# Patient Record
Sex: FEMALE | Race: WHITE | NOT HISPANIC OR LATINO
[De-identification: names, ages, dates, MRNs, and addresses within clinical notes are randomized per-mention and may not be internally consistent; named-entity substitution may affect disease eponyms.]

---

## 2017-02-09 ENCOUNTER — APPOINTMENT (OUTPATIENT)
Dept: INTERNAL MEDICINE | Facility: CLINIC | Age: 27
End: 2017-02-09

## 2017-02-27 ENCOUNTER — APPOINTMENT (OUTPATIENT)
Dept: INTERNAL MEDICINE | Facility: CLINIC | Age: 27
End: 2017-02-27

## 2017-02-27 VITALS — HEIGHT: 61.5 IN | HEART RATE: 85 BPM | WEIGHT: 99 LBS | BODY MASS INDEX: 18.45 KG/M2 | OXYGEN SATURATION: 100 %

## 2017-02-27 VITALS — SYSTOLIC BLOOD PRESSURE: 100 MMHG | DIASTOLIC BLOOD PRESSURE: 70 MMHG

## 2017-02-27 DIAGNOSIS — Z23 ENCOUNTER FOR IMMUNIZATION: ICD-10-CM

## 2017-05-13 ENCOUNTER — RX RENEWAL (OUTPATIENT)
Age: 27
End: 2017-05-13

## 2017-05-13 LAB
ALBUMIN SERPL ELPH-MCNC: 4.6 G/DL
ALP BLD-CCNC: 48 U/L
ALT SERPL-CCNC: 14 U/L
ANION GAP SERPL CALC-SCNC: 15 MMOL/L
AST SERPL-CCNC: 24 U/L
BASOPHILS # BLD AUTO: 0.06 K/UL
BASOPHILS NFR BLD AUTO: 1 %
BILIRUB SERPL-MCNC: 0.4 MG/DL
BILIRUB UR QL STRIP: NORMAL
BUN SERPL-MCNC: 14 MG/DL
CALCIUM SERPL-MCNC: 9.8 MG/DL
CHLORIDE SERPL-SCNC: 101 MMOL/L
CHOLEST SERPL-MCNC: 188 MG/DL
CHOLEST/HDLC SERPL: 2.1 RATIO
CO2 SERPL-SCNC: 23 MMOL/L
CREAT SERPL-MCNC: 0.72 MG/DL
EOSINOPHIL # BLD AUTO: 0.23 K/UL
EOSINOPHIL NFR BLD AUTO: 3.8 %
GLUCOSE SERPL-MCNC: 94 MG/DL
GLUCOSE UR-MCNC: NORMAL
HCG UR QL: 0.2 EU/DL
HCT VFR BLD CALC: 40.7 %
HDLC SERPL-MCNC: 89 MG/DL
HGB BLD-MCNC: 13 G/DL
HGB UR QL STRIP.AUTO: NORMAL
IMM GRANULOCYTES NFR BLD AUTO: 0.2 %
KETONES UR-MCNC: NORMAL
LDLC SERPL CALC-MCNC: 87 MG/DL
LEUKOCYTE ESTERASE UR QL STRIP: NORMAL
LYMPHOCYTES # BLD AUTO: 2.22 K/UL
LYMPHOCYTES NFR BLD AUTO: 36.8 %
MAN DIFF?: NORMAL
MCHC RBC-ENTMCNC: 30.7 PG
MCHC RBC-ENTMCNC: 31.9 GM/DL
MCV RBC AUTO: 96.2 FL
MONOCYTES # BLD AUTO: 0.61 K/UL
MONOCYTES NFR BLD AUTO: 10.1 %
NEUTROPHILS # BLD AUTO: 2.9 K/UL
NEUTROPHILS NFR BLD AUTO: 48.1 %
NITRITE UR QL STRIP: NORMAL
PH UR STRIP: 5.5
PLATELET # BLD AUTO: 346 K/UL
POTASSIUM SERPL-SCNC: 3.8 MMOL/L
PROT SERPL-MCNC: 7.3 G/DL
PROT UR STRIP-MCNC: NORMAL
RBC # BLD: 4.23 M/UL
RBC # FLD: 12.7 %
SODIUM SERPL-SCNC: 139 MMOL/L
SP GR UR STRIP: 1.02
T4 FREE SERPL-MCNC: 0.9 NG/DL
TRIGL SERPL-MCNC: 58 MG/DL
TSH SERPL-ACNC: 1.99 UIU/ML
WBC # FLD AUTO: 6.03 K/UL

## 2017-10-09 ENCOUNTER — RESULT REVIEW (OUTPATIENT)
Age: 27
End: 2017-10-09

## 2017-10-09 ENCOUNTER — APPOINTMENT (OUTPATIENT)
Dept: PEDIATRIC ALLERGY IMMUNOLOGY | Facility: CLINIC | Age: 27
End: 2017-10-09
Payer: COMMERCIAL

## 2017-10-09 ENCOUNTER — NON-APPOINTMENT (OUTPATIENT)
Age: 27
End: 2017-10-09

## 2017-10-09 ENCOUNTER — LABORATORY RESULT (OUTPATIENT)
Age: 27
End: 2017-10-09

## 2017-10-09 VITALS
WEIGHT: 100.8 LBS | HEART RATE: 68 BPM | SYSTOLIC BLOOD PRESSURE: 101 MMHG | BODY MASS INDEX: 18.55 KG/M2 | DIASTOLIC BLOOD PRESSURE: 69 MMHG | HEIGHT: 61.69 IN

## 2017-10-09 PROCEDURE — 94060 EVALUATION OF WHEEZING: CPT

## 2017-10-09 PROCEDURE — 36415 COLL VENOUS BLD VENIPUNCTURE: CPT

## 2017-10-09 PROCEDURE — 99244 OFF/OP CNSLTJ NEW/EST MOD 40: CPT

## 2017-10-09 RX ORDER — TETRAHYDROZOLINE HCL 0.05 %
0.05 DROPS OPHTHALMIC (EYE)
Refills: 0 | Status: DISCONTINUED | COMMUNITY
Start: 2017-10-09 | End: 2017-10-09

## 2017-10-12 LAB
A ALTERNATA IGE QN: <0.1 KUA/L
A FUMIGATUS IGE QN: <0.1 KUA/L
AMER BEECH IGE QN: ABNORMAL
BOXELDER IGE QN: <0.1 KUA/L
C HERBARUM IGE QN: <0.1 KUA/L
C LUNATA IGE QN: <0.1 KUA/L
CAT DANDER IGE QN: 0.29 KUA/L
CMN PIGWEED IGE QN: 0.1 KUA/L
COCKLEBUR IGE QN: <0.1 KUA/L
COCKSFOOT IGE QN: <0.1 KUA/L
COMMON RAGWEED IGE QN: <0.1 KUA/L
COTTONWOOD IGE QN: <0.1 KUA/L
D FARINAE IGE QN: 6.83 KUA/L
D PTERONYSS IGE QN: 6.81 KUA/L
DEPRECATED A ALTERNATA IGE RAST QL: 0
DEPRECATED A FUMIGATUS IGE RAST QL: 0
DEPRECATED A PULLULANS IGE RAST QL: 0
DEPRECATED AMER BEECH IGE RAST QL: 0.84 KUA/L
DEPRECATED BOXELDER IGE RAST QL: 0
DEPRECATED C HERBARUM IGE RAST QL: 0
DEPRECATED C LUNATA IGE RAST QL: 0
DEPRECATED CAT DANDER IGE RAST QL: NORMAL
DEPRECATED COCKLEBUR IGE RAST QL: 0
DEPRECATED COCKSFOOT IGE RAST QL: 0
DEPRECATED COMMON PIGWEED IGE RAST QL: NORMAL
DEPRECATED COMMON RAGWEED IGE RAST QL: 0
DEPRECATED COTTONWOOD IGE RAST QL: 0
DEPRECATED D FARINAE IGE RAST QL: ABNORMAL
DEPRECATED D PTERONYSS IGE RAST QL: ABNORMAL
DEPRECATED DOG DANDER IGE RAST QL: ABNORMAL
DEPRECATED ENGL PLANTAIN IGE RAST QL: 0
DEPRECATED F MONILIFORME IGE RAST QL: 0
DEPRECATED GIANT RAGWEED IGE RAST QL: 0
DEPRECATED GOOSE FEATHER IGE RAST QL: 0
DEPRECATED GOOSEFOOT IGE RAST QL: 0
DEPRECATED KENT BLUE GRASS IGE RAST QL: 0
DEPRECATED LONDON PLANE IGE RAST QL: 0
DEPRECATED M RACEMOSUS IGE RAST QL: 0
DEPRECATED MUGWORT IGE RAST QL: 0
DEPRECATED P NOTATUM IGE RAST QL: 0
DEPRECATED R NIGRICANS IGE RAST QL: 0
DEPRECATED RED CEDAR IGE RAST QL: 0
DEPRECATED RED TOP GRASS IGE RAST QL: 0
DEPRECATED ROACH IGE RAST QL: 0
DEPRECATED RYE IGE RAST QL: 0
DEPRECATED SALTWORT IGE RAST QL: 0
DEPRECATED SILVER BIRCH IGE RAST QL: ABNORMAL
DEPRECATED TIMOTHY IGE RAST QL: 0
DEPRECATED WHITE ASH IGE RAST QL: 0
DEPRECATED WHITE HICKORY IGE RAST QL: 0
DEPRECATED WHITE OAK IGE RAST QL: ABNORMAL
DOG DANDER IGE QN: 0.56 KUA/L
ENGL PLANTAIN IGE QN: <0.1 KUA/L
F MONILIFORME IGE QN: <0.1 KUA/L
GIANT RAGWEED IGE QN: <0.1 KUA/L
GOOSE FEATHER IGE QN: <0.1 KUA/L
GOOSEFOOT IGE QN: <0.1 KUA/L
GRAY ALDER (T2) CLASS: ABNORMAL
GRAY ALDER (T2) CONC: 1.34 KUA/L
KENT BLUE GRASS IGE QN: <0.1 KUA/L
LONDON PLANE IGE QN: <0.1 KUA/L
M RACEMOSUS IGE QN: <0.1 KUA/L
MOLD (AUREOBASIDIUM M12) CONC: <0.1 KUA/L
MUGWORT IGE QN: <0.1 KUA/L
MULBERRY (T70) CLASS: 0
MULBERRY (T70) CONC: <0.1 KUA/L
P NOTATUM IGE QN: <0.1 KUA/L
R NIGRICANS IGE QN: <0.1 KUA/L
RED CEDAR IGE QN: <0.1 KUA/L
RED TOP GRASS IGE QN: <0.1 KUA/L
ROACH IGE QN: <0.1 KUA/L
RYE IGE QN: <0.1 KUA/L
SALTWORT IGE QN: <0.1 KUA/L
SILVER BIRCH IGE QN: 1.7 KUA/L
TIMOTHY IGE QN: <0.1 KUA/L
TOTAL IGE SMQN RAST: 33 KU/L
WHITE ASH IGE QN: <0.1 KUA/L
WHITE ELM IGE QN: 0.18 KUA/L
WHITE ELM IGE QN: NORMAL
WHITE HICKORY IGE QN: <0.1 KUA/L
WHITE OAK IGE QN: 0.73 KUA/L

## 2017-10-31 ENCOUNTER — RX RENEWAL (OUTPATIENT)
Age: 27
End: 2017-10-31

## 2017-11-13 ENCOUNTER — NON-APPOINTMENT (OUTPATIENT)
Age: 27
End: 2017-11-13

## 2017-11-13 ENCOUNTER — APPOINTMENT (OUTPATIENT)
Dept: PEDIATRIC ALLERGY IMMUNOLOGY | Facility: CLINIC | Age: 27
End: 2017-11-13
Payer: COMMERCIAL

## 2017-11-13 VITALS
HEART RATE: 72 BPM | HEIGHT: 61.69 IN | OXYGEN SATURATION: 98 % | WEIGHT: 102 LBS | SYSTOLIC BLOOD PRESSURE: 107 MMHG | DIASTOLIC BLOOD PRESSURE: 69 MMHG | BODY MASS INDEX: 18.77 KG/M2

## 2017-11-13 DIAGNOSIS — J30.2 OTHER ALLERGIC RHINITIS: ICD-10-CM

## 2017-11-13 DIAGNOSIS — J30.89 OTHER ALLERGIC RHINITIS: ICD-10-CM

## 2017-11-13 PROCEDURE — 94010 BREATHING CAPACITY TEST: CPT

## 2017-11-13 PROCEDURE — 99214 OFFICE O/P EST MOD 30 MIN: CPT | Mod: 25

## 2017-11-28 ENCOUNTER — RX RENEWAL (OUTPATIENT)
Age: 27
End: 2017-11-28

## 2017-12-30 ENCOUNTER — RX RENEWAL (OUTPATIENT)
Age: 27
End: 2017-12-30

## 2018-01-22 ENCOUNTER — RX RENEWAL (OUTPATIENT)
Age: 28
End: 2018-01-22

## 2018-01-23 ENCOUNTER — TRANSCRIPTION ENCOUNTER (OUTPATIENT)
Age: 28
End: 2018-01-23

## 2018-02-24 ENCOUNTER — RX RENEWAL (OUTPATIENT)
Age: 28
End: 2018-02-24

## 2018-03-26 ENCOUNTER — APPOINTMENT (OUTPATIENT)
Dept: INTERNAL MEDICINE | Facility: CLINIC | Age: 28
End: 2018-03-26
Payer: COMMERCIAL

## 2018-03-26 VITALS
OXYGEN SATURATION: 100 % | HEIGHT: 62 IN | BODY MASS INDEX: 18.58 KG/M2 | SYSTOLIC BLOOD PRESSURE: 80 MMHG | HEART RATE: 82 BPM | WEIGHT: 101 LBS | DIASTOLIC BLOOD PRESSURE: 60 MMHG | TEMPERATURE: 97.9 F

## 2018-03-26 PROCEDURE — 36415 COLL VENOUS BLD VENIPUNCTURE: CPT

## 2018-03-26 PROCEDURE — 99395 PREV VISIT EST AGE 18-39: CPT | Mod: 25

## 2018-03-26 RX ORDER — FLUTICASONE PROPIONATE 50 UG/1
50 SPRAY, METERED NASAL DAILY
Qty: 1 | Refills: 2 | Status: DISCONTINUED | COMMUNITY
Start: 2017-10-09 | End: 2018-03-26

## 2018-03-27 LAB
BASOPHILS # BLD AUTO: 0.08 K/UL
BASOPHILS NFR BLD AUTO: 0.8 %
EOSINOPHIL # BLD AUTO: 0.4 K/UL
EOSINOPHIL NFR BLD AUTO: 4.2 %
HCT VFR BLD CALC: 37.5 %
HGB BLD-MCNC: 12.4 G/DL
IMM GRANULOCYTES NFR BLD AUTO: 0.2 %
LYMPHOCYTES # BLD AUTO: 2.66 K/UL
LYMPHOCYTES NFR BLD AUTO: 28 %
MAN DIFF?: NORMAL
MCHC RBC-ENTMCNC: 32.2 PG
MCHC RBC-ENTMCNC: 33.1 GM/DL
MCV RBC AUTO: 97.4 FL
MONOCYTES # BLD AUTO: 0.84 K/UL
MONOCYTES NFR BLD AUTO: 8.8 %
NEUTROPHILS # BLD AUTO: 5.51 K/UL
NEUTROPHILS NFR BLD AUTO: 58 %
PLATELET # BLD AUTO: 329 K/UL
RBC # BLD: 3.85 M/UL
RBC # FLD: 12.9 %
WBC # FLD AUTO: 9.51 K/UL

## 2018-03-29 ENCOUNTER — RX RENEWAL (OUTPATIENT)
Age: 28
End: 2018-03-29

## 2018-04-07 VITALS — DIASTOLIC BLOOD PRESSURE: 60 MMHG | SYSTOLIC BLOOD PRESSURE: 100 MMHG

## 2018-04-07 LAB
ALBUMIN SERPL ELPH-MCNC: 4.7 G/DL
ALP BLD-CCNC: 45 U/L
ALT SERPL-CCNC: 13 U/L
ANION GAP SERPL CALC-SCNC: 15 MMOL/L
AST SERPL-CCNC: 24 U/L
BILIRUB SERPL-MCNC: 0.4 MG/DL
BUN SERPL-MCNC: 17 MG/DL
C TRACH RRNA SPEC QL NAA+PROBE: NOT DETECTED
CALCIUM SERPL-MCNC: 9.9 MG/DL
CHLORIDE SERPL-SCNC: 99 MMOL/L
CHOLEST SERPL-MCNC: 184 MG/DL
CHOLEST/HDLC SERPL: 1.8 RATIO
CO2 SERPL-SCNC: 27 MMOL/L
CREAT SERPL-MCNC: 0.61 MG/DL
GLUCOSE SERPL-MCNC: 87 MG/DL
HBA1C MFR BLD HPLC: 4.8 %
HBV SURFACE AB SER QL: NONREACTIVE
HBV SURFACE AG SER QL: NONREACTIVE
HCV AB SER QL: NONREACTIVE
HCV S/CO RATIO: 0.14 S/CO
HDLC SERPL-MCNC: 102 MG/DL
HIV1+2 AB SPEC QL IA.RAPID: NONREACTIVE
HSV 1+2 IGG SER IA-IMP: NEGATIVE
HSV 1+2 IGG SER IA-IMP: POSITIVE
HSV1 IGG SER QL: 34.9 INDEX
HSV2 IGG SER QL: 0.1 INDEX
LDLC SERPL CALC-MCNC: 72 MG/DL
N GONORRHOEA RRNA SPEC QL NAA+PROBE: NOT DETECTED
POTASSIUM SERPL-SCNC: 3.8 MMOL/L
PROT SERPL-MCNC: 7.7 G/DL
SODIUM SERPL-SCNC: 141 MMOL/L
SOURCE AMPLIFICATION: NORMAL
T PALLIDUM AB SER QL IA: NEGATIVE
T4 FREE SERPL-MCNC: 0.9 NG/DL
TRIGL SERPL-MCNC: 50 MG/DL
TSH SERPL-ACNC: 1.84 UIU/ML

## 2018-06-01 ENCOUNTER — RX RENEWAL (OUTPATIENT)
Age: 28
End: 2018-06-01

## 2018-07-06 ENCOUNTER — RX RENEWAL (OUTPATIENT)
Age: 28
End: 2018-07-06

## 2018-08-03 ENCOUNTER — RX RENEWAL (OUTPATIENT)
Age: 28
End: 2018-08-03

## 2018-08-20 ENCOUNTER — APPOINTMENT (OUTPATIENT)
Dept: PEDIATRIC ALLERGY IMMUNOLOGY | Facility: CLINIC | Age: 28
End: 2018-08-20

## 2018-09-04 ENCOUNTER — APPOINTMENT (OUTPATIENT)
Dept: PEDIATRIC ALLERGY IMMUNOLOGY | Facility: CLINIC | Age: 28
End: 2018-09-04
Payer: COMMERCIAL

## 2018-09-04 VITALS
TEMPERATURE: 98.5 F | DIASTOLIC BLOOD PRESSURE: 72 MMHG | SYSTOLIC BLOOD PRESSURE: 105 MMHG | WEIGHT: 101 LBS | HEIGHT: 62 IN | HEART RATE: 85 BPM | OXYGEN SATURATION: 97 % | BODY MASS INDEX: 18.58 KG/M2

## 2018-09-04 DIAGNOSIS — T50.905D ADVERSE EFFECT OF UNSPECIFIED DRUGS, MEDICAMENTS AND BIOLOGICAL SUBSTANCES, SUBSEQUENT ENCOUNTER: ICD-10-CM

## 2018-09-04 DIAGNOSIS — H10.13 ACUTE ATOPIC CONJUNCTIVITIS, BILATERAL: ICD-10-CM

## 2018-09-04 PROCEDURE — 99214 OFFICE O/P EST MOD 30 MIN: CPT

## 2018-09-25 ENCOUNTER — TRANSCRIPTION ENCOUNTER (OUTPATIENT)
Age: 28
End: 2018-09-25

## 2018-11-21 ENCOUNTER — RX RENEWAL (OUTPATIENT)
Age: 28
End: 2018-11-21

## 2018-11-23 ENCOUNTER — TRANSCRIPTION ENCOUNTER (OUTPATIENT)
Age: 28
End: 2018-11-23

## 2019-02-01 ENCOUNTER — MEDICATION RENEWAL (OUTPATIENT)
Age: 29
End: 2019-02-01

## 2019-02-04 ENCOUNTER — MEDICATION RENEWAL (OUTPATIENT)
Age: 29
End: 2019-02-04

## 2019-03-22 ENCOUNTER — MEDICATION RENEWAL (OUTPATIENT)
Age: 29
End: 2019-03-22

## 2019-04-01 ENCOUNTER — APPOINTMENT (OUTPATIENT)
Dept: INTERNAL MEDICINE | Facility: CLINIC | Age: 29
End: 2019-04-01
Payer: COMMERCIAL

## 2019-04-01 VITALS — DIASTOLIC BLOOD PRESSURE: 55 MMHG | SYSTOLIC BLOOD PRESSURE: 90 MMHG

## 2019-04-01 VITALS
WEIGHT: 106 LBS | DIASTOLIC BLOOD PRESSURE: 60 MMHG | TEMPERATURE: 98 F | HEART RATE: 100 BPM | HEIGHT: 61.75 IN | BODY MASS INDEX: 19.51 KG/M2 | SYSTOLIC BLOOD PRESSURE: 98 MMHG | OXYGEN SATURATION: 98 % | RESPIRATION RATE: 14 BRPM

## 2019-04-01 DIAGNOSIS — Z12.83 ENCOUNTER FOR SCREENING FOR MALIGNANT NEOPLASM OF SKIN: ICD-10-CM

## 2019-04-01 PROCEDURE — 99395 PREV VISIT EST AGE 18-39: CPT | Mod: 25

## 2019-04-01 PROCEDURE — G0444 DEPRESSION SCREEN ANNUAL: CPT

## 2019-04-01 PROCEDURE — 36415 COLL VENOUS BLD VENIPUNCTURE: CPT

## 2019-04-01 NOTE — HISTORY OF PRESENT ILLNESS
[FreeTextEntry1] : The patient is here for a routine visit.  [de-identified] : She feels well in terms of the anxiety which is well controlled with medications.  She uses Clonazepam 0.5-1 mg PRN, usually about once per week.  \par \par She has had allergy and asthma symptoms, now improved with meds.  She saw an allergist.\par \par She has been having GI symptoms despite Zantac and she is going to see a gastroenterologist in [facility].  \par \par Her diet is healthy and she exercises regularly.  \par \par She doesn't smoke and she drinks ETOH socially.

## 2019-04-01 NOTE — ASSESSMENT
[FreeTextEntry1] : She is doing well in terms of the anxiety and she was counseled.  Continue current meds.\par \par Discussed diet and exercise.  Check routine blood tests.\par \par She sees her gyn and she has a new IUD.  She says STD testing isn't necessary.\par \par She sees an allergist as above.\par \par She will see a dermatologist for a routine skin check. \par

## 2019-04-01 NOTE — HEALTH RISK ASSESSMENT
[No falls in past year] : Patient reported no falls in the past year [0] : 2) Feeling down, depressed, or hopeless: Not at all (0) [Fully functional (bathing, dressing, toileting, transferring, walking, feeding)] : Fully functional (bathing, dressing, toileting, transferring, walking, feeding) [Fully functional (using the telephone, shopping, preparing meals, housekeeping, doing laundry, using] : Fully functional and needs no help or supervision to perform IADLs (using the telephone, shopping, preparing meals, housekeeping, doing laundry, using transportation, managing medications and managing finances) [] : No [[id number]] : 0 [Change in mental status noted] : No change in mental status noted [Reports changes in hearing] : Reports no changes in hearing [Reports changes in vision] : Reports no changes in vision [Reports changes in dental health] : Reports no changes in dental health

## 2019-04-10 ENCOUNTER — MEDICATION RENEWAL (OUTPATIENT)
Age: 29
End: 2019-04-10

## 2019-04-29 ENCOUNTER — RX RENEWAL (OUTPATIENT)
Age: 29
End: 2019-04-29

## 2019-05-07 ENCOUNTER — APPOINTMENT (OUTPATIENT)
Dept: PEDIATRIC ALLERGY IMMUNOLOGY | Facility: CLINIC | Age: 29
End: 2019-05-07
Payer: COMMERCIAL

## 2019-05-07 VITALS
TEMPERATURE: 98.2 F | OXYGEN SATURATION: 100 % | SYSTOLIC BLOOD PRESSURE: 105 MMHG | DIASTOLIC BLOOD PRESSURE: 69 MMHG | HEART RATE: 74 BPM

## 2019-05-07 DIAGNOSIS — H61.20 IMPACTED CERUMEN, UNSPECIFIED EAR: ICD-10-CM

## 2019-05-07 PROCEDURE — 94060 EVALUATION OF WHEEZING: CPT

## 2019-05-07 PROCEDURE — 99214 OFFICE O/P EST MOD 30 MIN: CPT | Mod: 25

## 2019-05-07 RX ORDER — CETIRIZINE HYDROCHLORIDE 10 MG/1
10 TABLET, COATED ORAL
Qty: 30 | Refills: 5 | Status: ACTIVE | COMMUNITY
Start: 2017-10-09 | End: 1900-01-01

## 2019-05-07 NOTE — CONSULT LETTER
[Consult Letter:] : I had the pleasure of evaluating your patient, [unfilled]. [Dear  ___] : Dear  [unfilled], [Please see my note below.] : Please see my note below. [Sincerely,] : Sincerely, [Consult Closing:] : Thank you very much for allowing me to participate in the care of this patient.  If you have any questions, please do not hesitate to contact me. [FreeTextEntry2] : DIANA HOLLAND [FreeTextEntry3] : Leah Dominguez MD\par Attending Physician \par Division of Allergy/Immunology \par [facility] \par \par  of Medicine and Pediatrics\par [facility] \par \par [address]\[place] [address]\par Tel: [phone]\par Fax: [phone]\par Email: [email or web address]\par \par \par \par

## 2019-05-07 NOTE — SOCIAL HISTORY
[Apartment] : [unfilled] lives in an apartment  [None] : none [de-identified] : just moved to [place] [de-identified] : pet dog in her parents' house in

## 2019-05-07 NOTE — PHYSICAL EXAM
[Alert] : alert [Well Nourished] : well nourished [Healthy Appearance] : healthy appearance [Well Developed] : well developed [Normal Pupil & Iris Size/Symmetry] : normal pupil and iris size and symmetry [No Acute Distress] : no acute distress [No Discharge] : no discharge [No Photophobia] : no photophobia [Normal TMs] : both tympanic membranes were normal [Sclera Not Icteric] : sclera not icteric [Normal Lips/Tongue] : the lips and tongue were normal [Normal Nasal Mucosa] : the nasal mucosa was normal [Normal Outer Ear/Nose] : the ears and nose were normal in appearance [Normal Tonsils] : normal tonsils [No Thrush] : no thrush [No Oral Lesions or Ulcers] : no oral lesions or ulcers [Normal Dentition] : normal dentition [Supple] : the neck was supple [Boggy Nasal Turbinates] : boggy and/or pale nasal turbinates [Normal Rate and Effort] : normal respiratory rhythm and effort [No Retractions] : no retractions [No Crackles] : no crackles [Normal Palpation] : palpation of the chest revealed no abnormalities [Bilateral Audible Breath Sounds] : bilateral audible breath sounds [Normal Rate] : heart rate was normal  [Normal S1, S2] : normal S1 and S2 [No murmur] : no murmur [Regular Rhythm] : with a regular rhythm [Not Tender] : non-tender [Soft] : abdomen soft [No HSM] : no hepato-splenomegaly [Not Distended] : not distended [Normal Cervical Lymph Nodes] : cervical [Normal Axillary Lumph Nodes] : axillary [Skin Intact] : skin intact  [No Rash] : no rash [No Skin Lesions] : no skin lesions [No Joint Swelling or Erythema] : no joint swelling or erythema [No clubbing] : no clubbing [No Cyanosis] : no cyanosis [No Edema] : no edema [Alert, Awake, Oriented as Age-Appropriate] : alert, awake, oriented as age appropriate [Normal Mood] : mood was normal [Normal Affect] : affect was normal [Wheezing] : no wheezing was heard [Eczematous Patches] : no eczematous patches [de-identified] : cerumen impaction

## 2019-05-07 NOTE — IMPRESSION
[Spirometry] : Spirometry [Normal Spirometry] : spirometry normal [Reversible] : , without reversibility.

## 2019-05-07 NOTE — HISTORY OF PRESENT ILLNESS
[de-identified] : Varsha is a 28 year old girl with a history of asthma, allergic rhinoconjunctivitis and drug allergy who is here for follow-up.  She was last seen in September 2018.\par \par No asthma flare-ups. Interested in switching rescue inhaler. Currently uses ventolin. Wants to switch to Proair - used her friends once and felt much better.  Does not use frequently. rarely uses a a spacer. Forgets pulmicort dose about once a week. No ED visits, no OCS use. Does pilates and barre with no issues but these are more isometric exercises, not aerobic.\par Since pollen season started she has had nasal congestion, no ocular symptoms. Uses eye drops PRN. Insurance did not cover Zaditor. Takes zyrtec everyday and if she misses a dose her nasal symptoms worsen. \par \par September 2018:\par Immunocaps previously positive to tree, dog and dust mite.  \par She continues to take cetirizine daily and Flonase PRN.  Takes zyrtec year round.   If she misses a dose of zyrtec she begins to feel allergy symptoms and develop hives.  She takes Flonase only rarely bt states that she should take it more because she is frequently congested.\par Zaditor - uses it sporadically.  Ocular symptoms are not really an issue for her unless she spends extended time with her family dog. \par She has a pet dog at her parents' home in  but [place] the city [place] pets.  The dog makes her sneeze and sometimes have itchy eyes. \par \par Drug allergy - she is avoiding penicillin antibiotics and tetracyclines.  Skin testing previously deferred due to use of oral antihistamines and benzodiazepines. \par Takes zantac daily for heartburn.\par \par Asthma - she continues on pulmicort flexhaler 90mcg/puff.  She has been taking 2 puffs once a day and rinses her mouth afterwrds.  She feels well.  No nocturnal cough. No activity limitation. She does barre but does not do anything aerobic. \par Last ventolin use was last week during very humid conditions. \par Takes Ventolin about once a week\par Triggers include humidity and very cold, dry air.  Using a scarf around her mouth helps minimize exposure to cold air.

## 2019-07-29 ENCOUNTER — RX RENEWAL (OUTPATIENT)
Age: 29
End: 2019-07-29

## 2019-09-11 ENCOUNTER — MEDICATION RENEWAL (OUTPATIENT)
Age: 29
End: 2019-09-11

## 2019-09-11 LAB
ALBUMIN SERPL ELPH-MCNC: 5 G/DL
ALP BLD-CCNC: 48 U/L
ALT SERPL-CCNC: 10 U/L
ANION GAP SERPL CALC-SCNC: 13 MMOL/L
APPEARANCE: ABNORMAL
AST SERPL-CCNC: 22 U/L
BACTERIA: ABNORMAL
BASOPHILS # BLD AUTO: 0.08 K/UL
BASOPHILS NFR BLD AUTO: 1 %
BILIRUB SERPL-MCNC: <0.2 MG/DL
BILIRUBIN URINE: NEGATIVE
BLOOD URINE: NORMAL
BUN SERPL-MCNC: 15 MG/DL
CALCIUM SERPL-MCNC: 10 MG/DL
CHLORIDE SERPL-SCNC: 104 MMOL/L
CHOLEST SERPL-MCNC: 181 MG/DL
CHOLEST/HDLC SERPL: 2.3 RATIO
CO2 SERPL-SCNC: 26 MMOL/L
COLOR: YELLOW
CREAT SERPL-MCNC: 0.65 MG/DL
EOSINOPHIL # BLD AUTO: 0.21 K/UL
EOSINOPHIL NFR BLD AUTO: 2.6 %
ESTIMATED AVERAGE GLUCOSE: 88 MG/DL
GLUCOSE QUALITATIVE U: NEGATIVE
GLUCOSE SERPL-MCNC: 77 MG/DL
HBA1C MFR BLD HPLC: 4.7 %
HCT VFR BLD CALC: 39.1 %
HDLC SERPL-MCNC: 79 MG/DL
HGB BLD-MCNC: 12.4 G/DL
HYALINE CASTS: 1 /LPF
IMM GRANULOCYTES NFR BLD AUTO: 0.2 %
KETONES URINE: NEGATIVE
LDLC SERPL CALC-MCNC: 82 MG/DL
LEUKOCYTE ESTERASE URINE: NEGATIVE
LYMPHOCYTES # BLD AUTO: 2.65 K/UL
LYMPHOCYTES NFR BLD AUTO: 32.9 %
MAN DIFF?: NORMAL
MCHC RBC-ENTMCNC: 31.7 GM/DL
MCHC RBC-ENTMCNC: 32.1 PG
MCV RBC AUTO: 101.3 FL
MICROSCOPIC-UA: NORMAL
MONOCYTES # BLD AUTO: 0.67 K/UL
MONOCYTES NFR BLD AUTO: 8.3 %
NEUTROPHILS # BLD AUTO: 4.42 K/UL
NEUTROPHILS NFR BLD AUTO: 55 %
NITRITE URINE: NEGATIVE
PH URINE: 6
PLATELET # BLD AUTO: 289 K/UL
POTASSIUM SERPL-SCNC: 4.9 MMOL/L
PROT SERPL-MCNC: 7.2 G/DL
PROTEIN URINE: ABNORMAL
RBC # BLD: 3.86 M/UL
RBC # FLD: 12 %
RED BLOOD CELLS URINE: 2 /HPF
SODIUM SERPL-SCNC: 143 MMOL/L
SPECIFIC GRAVITY URINE: 1.03
SQUAMOUS EPITHELIAL CELLS: 14 /HPF
T4 FREE SERPL-MCNC: 0.8 NG/DL
TRIGL SERPL-MCNC: 100 MG/DL
TSH SERPL-ACNC: 1.23 UIU/ML
UROBILINOGEN URINE: NORMAL
WBC # FLD AUTO: 8.05 K/UL
WHITE BLOOD CELLS URINE: 2 /HPF

## 2020-01-27 ENCOUNTER — APPOINTMENT (OUTPATIENT)
Dept: PEDIATRIC ALLERGY IMMUNOLOGY | Facility: CLINIC | Age: 30
End: 2020-01-27
Payer: COMMERCIAL

## 2020-01-27 VITALS
BODY MASS INDEX: 18.86 KG/M2 | WEIGHT: 102.51 LBS | DIASTOLIC BLOOD PRESSURE: 64 MMHG | HEIGHT: 61.73 IN | TEMPERATURE: 98.7 F | SYSTOLIC BLOOD PRESSURE: 101 MMHG | HEART RATE: 98 BPM | OXYGEN SATURATION: 99 %

## 2020-01-27 DIAGNOSIS — J30.81 ALLERGIC RHINITIS DUE TO ANIMAL (CAT) (DOG) HAIR AND DANDER: ICD-10-CM

## 2020-01-27 PROCEDURE — 99214 OFFICE O/P EST MOD 30 MIN: CPT | Mod: 25

## 2020-01-27 PROCEDURE — 94010 BREATHING CAPACITY TEST: CPT

## 2020-01-27 NOTE — HISTORY OF PRESENT ILLNESS
[de-identified] : Varsha is a 29 year old girl with a history of asthma, allergic rhinoconjunctivitis and drug allergy who is here for follow-up.  She was last seen in May 2019.\par \par She stopped taking her pulmicort about 3 months ago. She has had nocturnal cough that wakes her up at least once a week. Also has some activity limitation. Takes albuterol once a week or more if she is doing strenuous activity or is sick.\par Started a nasal steroid spray - mometasone. N no nosebleeds but dry nose. Not much improvement. Seen by ENT and planned for deviated septum repair. \par Takes zyrtec every night. \par She has taken 1 course of abx this winter. Had a sinus infection. \par \par May 2019:\par No asthma flare-ups. Interested in switching rescue inhaler. Currently uses ventolin. Wants to switch to Proair - used her friends once and felt much better.  Does not use frequently. rarely uses a a spacer. Forgets pulmicort dose about once a week. No ED visits, no OCS use. Does pilates and barre with no issues but these are more isometric exercises, not aerobic.\par Since pollen season started she has had nasal congestion, no ocular symptoms. Uses eye drops PRN. Insurance did not cover Zaditor. Takes zyrtec everyday and if she misses a dose her nasal symptoms worsen. \par \par September 2018:\par Immunocaps previously positive to tree, dog and dust mite.  \par She continues to take cetirizine daily and Flonase PRN.  Takes zyrtec year round.   If she misses a dose of zyrtec she begins to feel allergy symptoms and develop hives.  She takes Flonase only rarely bt states that she should take it more because she is frequently congested.\par Zaditor - uses it sporadically.  Ocular symptoms are not really an issue for her unless she spends extended time with her family dog. \par She has a pet dog at her parents' home in  but she just [place] to the city where she has no pets.  The dog makes her sneeze and sometimes have itchy eyes. \par \par Drug allergy - she is avoiding penicillin antibiotics and tetracyclines.  Skin testing previously deferred due to use of oral antihistamines and benzodiazepines. \par Takes zantac daily for heartburn.\par \par Asthma - she continues on pulmicort flexhaler 90mcg/puff.  She has been taking 2 puffs once a day and rinses her mouth afterwrds.  She feels well.  No nocturnal cough. No activity limitation. She does barre but does not do anything aerobic. \par Last ventolin use was last week during very humid conditions. \par Takes Ventolin about once a week\par Triggers include humidity and very cold, dry air.  Using a scarf around her mouth helps minimize exposure to cold air.

## 2020-01-27 NOTE — SOCIAL HISTORY
[Apartment] : [unfilled] lives in an apartment  [None] : none [de-identified] : just moved to [place] [de-identified] : pet dog in her parents' house in

## 2020-01-27 NOTE — CONSULT LETTER
[Dear  ___] : Dear  [unfilled], [Please see my note below.] : Please see my note below. [Consult Closing:] : Thank you very much for allowing me to participate in the care of this patient.  If you have any questions, please do not hesitate to contact me. [Sincerely,] : Sincerely, [Courtesy Letter:] : I had the pleasure of seeing your patient, [unfilled], in my office today. [FreeTextEntry2] : DIANA HOLLAND [FreeTextEntry3] : Leah Dominguez MD\par Attending Physician \par Division of Allergy/Immunology \par [facility] \par \par  of Medicine and Pediatrics\par [facility] \par \par [address]\[place] [address]\par Tel: [phone]\par Fax: [phone]\par Email: [email or web address]\par \par \par \par

## 2020-01-27 NOTE — PHYSICAL EXAM
[Alert] : alert [Well Nourished] : well nourished [Healthy Appearance] : healthy appearance [No Acute Distress] : no acute distress [Well Developed] : well developed [Normal Pupil & Iris Size/Symmetry] : normal pupil and iris size and symmetry [No Discharge] : no discharge [No Photophobia] : no photophobia [Sclera Not Icteric] : sclera not icteric [Normal TMs] : both tympanic membranes were normal [Normal Nasal Mucosa] : the nasal mucosa was normal [Normal Lips/Tongue] : the lips and tongue were normal [Normal Outer Ear/Nose] : the ears and nose were normal in appearance [Normal Tonsils] : normal tonsils [No Thrush] : no thrush [Normal Dentition] : normal dentition [No Oral Lesions or Ulcers] : no oral lesions or ulcers [Boggy Nasal Turbinates] : boggy and/or pale nasal turbinates [Supple] : the neck was supple [Normal Rate and Effort] : normal respiratory rhythm and effort [Normal Palpation] : palpation of the chest revealed no abnormalities [No Crackles] : no crackles [No Retractions] : no retractions [Bilateral Audible Breath Sounds] : bilateral audible breath sounds [Normal Rate] : heart rate was normal  [Normal S1, S2] : normal S1 and S2 [No murmur] : no murmur [Regular Rhythm] : with a regular rhythm [Soft] : abdomen soft [Not Tender] : non-tender [Not Distended] : not distended [No HSM] : no hepato-splenomegaly [Normal Cervical Lymph Nodes] : cervical [Normal Axillary Lumph Nodes] : axillary [Skin Intact] : skin intact  [No Rash] : no rash [No Skin Lesions] : no skin lesions [No Joint Swelling or Erythema] : no joint swelling or erythema [No clubbing] : no clubbing [No Edema] : no edema [No Cyanosis] : no cyanosis [Normal Mood] : mood was normal [Normal Affect] : affect was normal [Alert, Awake, Oriented as Age-Appropriate] : alert, awake, oriented as age appropriate [Wheezing] : no wheezing was heard [Eczematous Patches] : no eczematous patches [de-identified] : cerumen impaction

## 2020-02-29 ENCOUNTER — APPOINTMENT (OUTPATIENT)
Dept: CT IMAGING | Facility: CLINIC | Age: 30
End: 2020-02-29
Payer: COMMERCIAL

## 2020-02-29 ENCOUNTER — OUTPATIENT (OUTPATIENT)
Dept: OUTPATIENT SERVICES | Facility: HOSPITAL | Age: 30
LOS: 1 days | End: 2020-02-29

## 2020-02-29 DIAGNOSIS — Z98.89 OTHER SPECIFIED POSTPROCEDURAL STATES: Chronic | ICD-10-CM

## 2020-02-29 PROCEDURE — 70486 CT MAXILLOFACIAL W/O DYE: CPT | Mod: 26

## 2020-05-04 ENCOUNTER — APPOINTMENT (OUTPATIENT)
Dept: INTERNAL MEDICINE | Facility: CLINIC | Age: 30
End: 2020-05-04
Payer: COMMERCIAL

## 2020-05-04 DIAGNOSIS — J06.9 ACUTE UPPER RESPIRATORY INFECTION, UNSPECIFIED: ICD-10-CM

## 2020-05-04 PROCEDURE — 99213 OFFICE O/P EST LOW 20 MIN: CPT | Mod: 95

## 2020-05-07 NOTE — ASSESSMENT
[FreeTextEntry1] : She likely has a URI now, probably viral.  She is concerned because she had persistent symptoms for months over the winter.  We agreed to hold off on antibiotics for now since the discharge is clear, no fever and no sinus pain.  She will continue OTC meds and rest, drink fluids.  Call PRN if the symptoms persist or change and would consider antibiotics.\par \par We also discussed the possibility that this could be COVID-19.  Symptoms are mild and she has no fever or dyspnea.  We discussed the option of testing which she will consider.  She is living in [place] and would go locally if she decides to go.  Call PRN.  Consider antibody testing later.

## 2020-05-07 NOTE — HISTORY OF PRESENT ILLNESS
[Home] : at home, [unfilled] , at the time of the visit. [Medical Office: ([facility])___] : at the medical office located in  [Patient] : the patient [Self] : self [FreeTextEntry8] : The patient had persistent symptoms over the fall into winter of URI symptoms, nasal congestion, post nasal drip, cough, sinus symptoms.  She was seen at an [facility] twice and treated with antibiotics and steroids.  She saw her allergist and an ENT and she had a CT scan (sinuses?) on 2/29/20.  Eventually the symptoms improved and she has been well for over a month.\par \par She now has symptoms for 4-5 days- cough with clear mucus, fatigue, clammy, post nasal drip, ears pop.  No sinus pain, ear pain, dyspnea, chest pain, yellow phlegm, fever.  Sudafed helps and she has been using Pulmicort since the previous illness.  She has mostly been at home and isolating but she goes out at times and she lives with her boyfriend who is not sick.  \par \par She is working remotely and she sees her therapist by video.

## 2020-05-13 ENCOUNTER — APPOINTMENT (OUTPATIENT)
Dept: INTERNAL MEDICINE | Facility: CLINIC | Age: 30
End: 2020-05-13
Payer: COMMERCIAL

## 2020-05-13 PROCEDURE — 99442: CPT

## 2020-05-14 ENCOUNTER — APPOINTMENT (OUTPATIENT)
Dept: INTERNAL MEDICINE | Facility: CLINIC | Age: 30
End: 2020-05-14

## 2020-06-22 ENCOUNTER — APPOINTMENT (OUTPATIENT)
Dept: INTERNAL MEDICINE | Facility: CLINIC | Age: 30
End: 2020-06-22
Payer: COMMERCIAL

## 2020-06-22 VITALS
BODY MASS INDEX: 17.64 KG/M2 | OXYGEN SATURATION: 99 % | HEART RATE: 100 BPM | RESPIRATION RATE: 16 BRPM | WEIGHT: 99.56 LBS | HEIGHT: 62.9 IN | TEMPERATURE: 98.5 F

## 2020-06-22 VITALS — HEIGHT: 62.9 IN | BODY MASS INDEX: 17.64 KG/M2 | WEIGHT: 99.56 LBS

## 2020-06-22 PROCEDURE — 36415 COLL VENOUS BLD VENIPUNCTURE: CPT

## 2020-06-22 PROCEDURE — 99395 PREV VISIT EST AGE 18-39: CPT | Mod: 25

## 2020-06-22 RX ORDER — OMEPRAZOLE 20 MG/1
20 TABLET, DELAYED RELEASE ORAL
Qty: 30 | Refills: 3 | Status: ACTIVE | COMMUNITY
Start: 2020-06-22

## 2020-06-22 RX ORDER — FLUTICASONE FUROATE 27.5 UG/1
27.5 SPRAY, METERED NASAL DAILY
Qty: 1 | Refills: 5 | Status: DISCONTINUED | COMMUNITY
Start: 2019-05-07 | End: 2020-06-22

## 2020-06-22 RX ORDER — ASPIRIN 81 MG
6.5 TABLET, DELAYED RELEASE (ENTERIC COATED) ORAL
Qty: 1 | Refills: 2 | Status: DISCONTINUED | COMMUNITY
Start: 2019-05-07 | End: 2020-06-22

## 2020-06-22 RX ORDER — DOXYCYCLINE HYCLATE 100 MG/1
100 TABLET ORAL
Qty: 20 | Refills: 0 | Status: DISCONTINUED | COMMUNITY
Start: 2020-05-13 | End: 2020-06-22

## 2020-06-28 VITALS — SYSTOLIC BLOOD PRESSURE: 105 MMHG | DIASTOLIC BLOOD PRESSURE: 60 MMHG

## 2020-06-28 LAB
25(OH)D3 SERPL-MCNC: 52.2 NG/ML
ALBUMIN SERPL ELPH-MCNC: 5.2 G/DL
ALP BLD-CCNC: 57 U/L
ALT SERPL-CCNC: 12 U/L
ANION GAP SERPL CALC-SCNC: 15 MMOL/L
APPEARANCE: CLEAR
AST SERPL-CCNC: 22 U/L
BACTERIA: NEGATIVE
BASOPHILS # BLD AUTO: 0.1 K/UL
BASOPHILS NFR BLD AUTO: 0.7 %
BILIRUB SERPL-MCNC: 0.3 MG/DL
BILIRUBIN URINE: NEGATIVE
BLOOD URINE: NEGATIVE
BUN SERPL-MCNC: 18 MG/DL
CALCIUM SERPL-MCNC: 10.4 MG/DL
CHLORIDE SERPL-SCNC: 99 MMOL/L
CHOLEST SERPL-MCNC: 184 MG/DL
CHOLEST/HDLC SERPL: 2 RATIO
CO2 SERPL-SCNC: 24 MMOL/L
COLOR: NORMAL
CREAT SERPL-MCNC: 0.75 MG/DL
EOSINOPHIL # BLD AUTO: 0.26 K/UL
EOSINOPHIL NFR BLD AUTO: 1.9 %
ESTIMATED AVERAGE GLUCOSE: 88 MG/DL
FOLATE SERPL-MCNC: 17.8 NG/ML
GLUCOSE QUALITATIVE U: NEGATIVE
GLUCOSE SERPL-MCNC: 103 MG/DL
HBA1C MFR BLD HPLC: 4.7 %
HCT VFR BLD CALC: 39.6 %
HDLC SERPL-MCNC: 92 MG/DL
HGB BLD-MCNC: 12.9 G/DL
HYALINE CASTS: 4 /LPF
IMM GRANULOCYTES NFR BLD AUTO: 0.4 %
KETONES URINE: NEGATIVE
LDLC SERPL CALC-MCNC: 74 MG/DL
LEUKOCYTE ESTERASE URINE: NEGATIVE
LYMPHOCYTES # BLD AUTO: 3 K/UL
LYMPHOCYTES NFR BLD AUTO: 21.4 %
MAN DIFF?: NORMAL
MCHC RBC-ENTMCNC: 32.4 PG
MCHC RBC-ENTMCNC: 32.6 GM/DL
MCV RBC AUTO: 99.5 FL
MICROSCOPIC-UA: NORMAL
MONOCYTES # BLD AUTO: 1.16 K/UL
MONOCYTES NFR BLD AUTO: 8.3 %
NEUTROPHILS # BLD AUTO: 9.43 K/UL
NEUTROPHILS NFR BLD AUTO: 67.3 %
NITRITE URINE: NEGATIVE
PH URINE: 7
PLATELET # BLD AUTO: 330 K/UL
POTASSIUM SERPL-SCNC: 4.6 MMOL/L
PROT SERPL-MCNC: 7.5 G/DL
PROTEIN URINE: NEGATIVE
RBC # BLD: 3.98 M/UL
RBC # FLD: 12.3 %
RED BLOOD CELLS URINE: 2 /HPF
SARS-COV-2 IGG SERPL IA-ACNC: 0.01 INDEX
SARS-COV-2 IGG SERPL QL IA: NEGATIVE
SODIUM SERPL-SCNC: 138 MMOL/L
SPECIFIC GRAVITY URINE: 1.01
SQUAMOUS EPITHELIAL CELLS: 8 /HPF
T4 FREE SERPL-MCNC: 1 NG/DL
TRIGL SERPL-MCNC: 91 MG/DL
TSH SERPL-ACNC: 1.16 UIU/ML
UROBILINOGEN URINE: NORMAL
VIT B12 SERPL-MCNC: 918 PG/ML
WBC # FLD AUTO: 14.01 K/UL
WHITE BLOOD CELLS URINE: 1 /HPF

## 2020-06-28 NOTE — HEALTH RISK ASSESSMENT
[No] : No [0] : 2) Feeling down, depressed, or hopeless: Not at all (0) [Fully functional (bathing, dressing, toileting, transferring, walking, feeding)] : Fully functional (bathing, dressing, toileting, transferring, walking, feeding) [Fully functional (using the telephone, shopping, preparing meals, housekeeping, doing laundry, using] : Fully functional and needs no help or supervision to perform IADLs (using the telephone, shopping, preparing meals, housekeeping, doing laundry, using transportation, managing medications and managing finances) [] : No [[id number]] : 0 [Reports changes in hearing] : Reports no changes in hearing [Reports changes in vision] : Reports no changes in vision [Reports changes in dental health] : Reports no changes in dental health

## 2020-06-28 NOTE — ASSESSMENT
[FreeTextEntry1] : She has had URI symptoms, sinus symptoms with possible allergy component which is now improved.  Monitor and call PRN.  She has occasional symptoms now.  Check a COVID-19 antibody.\par \par Discussed diet and exercise.\par \par She still has some anxiety symptoms and she was counseled.  Consider increasing the Sertraline but keep the same for now.  Clonazepam PRN ok.  She sees a therapist.\par \par She declines STD testing.  \par \par She will see her gyn soon.  She has an IUD.

## 2020-06-28 NOTE — HISTORY OF PRESENT ILLNESS
[FreeTextEntry1] : The patient is here for a routine visit.  [de-identified] : She now feels well.  She previously had URI symptoms, post nasal drip, swollen glands.  She had COVID-19 testing which was negative.  She took Doxycycline which seemed to help.  She still has intermittent symptoms but improved.  She uses Sudafed.  She has seen an allergist.  \par \par She has occasional anxiety which is a lot better.  She sees a therapist who is helpful.  She uses Clonazepam 2-3 times per week.\par \par Her diet is good and she exercises.\par \par She mentions that a cousin was diagnosed with complement deficiency.

## 2020-06-28 NOTE — PHYSICAL EXAM
[No Acute Distress] : no acute distress [Well Nourished] : well nourished [Well Developed] : well developed [Well-Appearing] : well-appearing [Normal Sclera/Conjunctiva] : normal sclera/conjunctiva [PERRL] : pupils equal round and reactive to light [EOMI] : extraocular movements intact [Normal Outer Ear/Nose] : the outer ears and nose were normal in appearance [Normal Oropharynx] : the oropharynx was normal [No JVD] : no jugular venous distention [No Lymphadenopathy] : no lymphadenopathy [Supple] : supple [Thyroid Normal, No Nodules] : the thyroid was normal and there were no nodules present [No Respiratory Distress] : no respiratory distress  [No Accessory Muscle Use] : no accessory muscle use [Clear to Auscultation] : lungs were clear to auscultation bilaterally [Normal Rate] : normal rate  [Regular Rhythm] : with a regular rhythm [Normal S1, S2] : normal S1 and S2 [No Murmur] : no murmur heard [No Carotid Bruits] : no carotid bruits [No Abdominal Bruit] : a ~M bruit was not heard ~T in the abdomen [No Varicosities] : no varicosities [Pedal Pulses Present] : the pedal pulses are present [No Edema] : there was no peripheral edema [No Palpable Aorta] : no palpable aorta [No Extremity Clubbing/Cyanosis] : no extremity clubbing/cyanosis [Soft] : abdomen soft [Non Tender] : non-tender [Non-distended] : non-distended [No Masses] : no abdominal mass palpated [No HSM] : no HSM [Normal Bowel Sounds] : normal bowel sounds [Normal Posterior Cervical Nodes] : no posterior cervical lymphadenopathy [Normal Anterior Cervical Nodes] : no anterior cervical lymphadenopathy [No CVA Tenderness] : no CVA  tenderness [No Joint Swelling] : no joint swelling [No Spinal Tenderness] : no spinal tenderness [Grossly Normal Strength/Tone] : grossly normal strength/tone [No Rash] : no rash [Coordination Grossly Intact] : coordination grossly intact [No Focal Deficits] : no focal deficits [Normal Gait] : normal gait [Deep Tendon Reflexes (DTR)] : deep tendon reflexes were 2+ and symmetric [Normal Affect] : the affect was normal [Normal Insight/Judgement] : insight and judgment were intact [de-identified] : thin female

## 2020-07-08 ENCOUNTER — TRANSCRIPTION ENCOUNTER (OUTPATIENT)
Age: 30
End: 2020-07-08

## 2020-07-13 ENCOUNTER — TRANSCRIPTION ENCOUNTER (OUTPATIENT)
Age: 30
End: 2020-07-13

## 2020-07-13 ENCOUNTER — LABORATORY RESULT (OUTPATIENT)
Age: 30
End: 2020-07-13

## 2020-07-16 ENCOUNTER — TRANSCRIPTION ENCOUNTER (OUTPATIENT)
Age: 30
End: 2020-07-16

## 2020-07-17 ENCOUNTER — TRANSCRIPTION ENCOUNTER (OUTPATIENT)
Age: 30
End: 2020-07-17

## 2020-08-24 ENCOUNTER — RX RENEWAL (OUTPATIENT)
Age: 30
End: 2020-08-24

## 2020-10-01 ENCOUNTER — RESULT REVIEW (OUTPATIENT)
Age: 30
End: 2020-10-01

## 2020-10-13 ENCOUNTER — APPOINTMENT (OUTPATIENT)
Dept: INTERNAL MEDICINE | Facility: CLINIC | Age: 30
End: 2020-10-13
Payer: COMMERCIAL

## 2020-10-13 PROCEDURE — 99213 OFFICE O/P EST LOW 20 MIN: CPT | Mod: 95

## 2020-10-14 NOTE — HISTORY OF PRESENT ILLNESS
[Home] : at home, [unfilled] , at the time of the visit. [Medical Office: ([facility])___] : at the medical office located in  [Verbal consent obtained from patient] : the patient, [unfilled] [de-identified] : The patient is seen for a telehealth visit today.  She has had anxiety, her mood is not great.  Work is very stressful and the pandemic has been stressful.\par \par She is now living in [place].\par \par She is not using the Clonazepam.\par

## 2020-10-14 NOTE — ASSESSMENT
[FreeTextEntry1] : The patient is having more anxiety.  She was counseled.  She is seeing a therapist.  The patient and therapist feel it would be helpful to increase the Sertraline and I agree.  Increase it to 100 mg and follow-up in 3-4 weeks.  Call sooner PRN.\par \par She mentions URI symptoms in the last couple of days and she feels a lump of her posterior neck for 1-2 days.  It is likely a reactive lymph node but evaluation limited given the telehealth visit.  Monitor for now but she should come in if it doesn't resolve.

## 2020-11-18 ENCOUNTER — TRANSCRIPTION ENCOUNTER (OUTPATIENT)
Age: 30
End: 2020-11-18

## 2020-11-19 ENCOUNTER — NON-APPOINTMENT (OUTPATIENT)
Age: 30
End: 2020-11-19

## 2020-11-23 ENCOUNTER — APPOINTMENT (OUTPATIENT)
Dept: INTERNAL MEDICINE | Facility: CLINIC | Age: 30
End: 2020-11-23
Payer: COMMERCIAL

## 2020-11-23 VITALS
OXYGEN SATURATION: 99 % | HEIGHT: 61.5 IN | WEIGHT: 95 LBS | BODY MASS INDEX: 17.71 KG/M2 | HEART RATE: 148 BPM | TEMPERATURE: 98.1 F

## 2020-11-23 VITALS — SYSTOLIC BLOOD PRESSURE: 100 MMHG | DIASTOLIC BLOOD PRESSURE: 65 MMHG

## 2020-11-23 DIAGNOSIS — Z11.59 ENCOUNTER FOR SCREENING FOR OTHER VIRAL DISEASES: ICD-10-CM

## 2020-11-23 PROCEDURE — 99214 OFFICE O/P EST MOD 30 MIN: CPT | Mod: 25

## 2020-11-23 PROCEDURE — G0008: CPT

## 2020-11-23 PROCEDURE — 36415 COLL VENOUS BLD VENIPUNCTURE: CPT

## 2020-11-23 PROCEDURE — 90686 IIV4 VACC NO PRSV 0.5 ML IM: CPT

## 2020-11-23 NOTE — HISTORY OF PRESENT ILLNESS
[de-identified] : The patient comes in to follow-up regarding the anxiety and eating disorder.  She reports that she has a long history of an eating disorder that has recently become more active.  She has been under a lot of stress and anxiety because of the pandemic.    She lives in an apartment in [place] with her boyfriend and she works from home.  She can be lonely at home (her boyfriend lives with her but he goes out to work).  She is seeing a new therapist, Isabel Leon, who she likes and is helping her.  She advised the patient to be seen here for a medical evaluation and blood tests.  \par \par She says her IBS symptoms are recently worse.\par \par She delays meals and she feels guilty when she eats.  Anxiety worse when she eatsShe doesn't induce vomiting (had done this many years ago but not now.)

## 2020-11-23 NOTE — PHYSICAL EXAM
[Normal] : soft, non-tender, non-distended, no masses palpated, no HSM and normal bowel sounds [de-identified] : Thin female NAD

## 2020-11-23 NOTE — ASSESSMENT
[FreeTextEntry1] : The patient has anorexia nervosa and she was counseled.  Her weight now is 95 pounds which is 5-10 pounds lower than her baseline.  She is seeing a therapist who she likes.  She is also going to see a psychiatrist next month.  Monitor weight and she can come here for that.  Will check blood tests now including a metabolic, estradiol, TFTs, vitamins.  Continue Sertraline.  She uses Clonazepam once per day.\par \par Consider DEXA later.\par \par Flu vaccine today.\par \par She requests a prescription for a COVID-19 PCR which she wants to do prior to going home for Thanksgiving.  \par \par IBS symptoms discussed.

## 2020-11-24 ENCOUNTER — LABORATORY RESULT (OUTPATIENT)
Age: 30
End: 2020-11-24

## 2020-12-08 ENCOUNTER — TRANSCRIPTION ENCOUNTER (OUTPATIENT)
Age: 30
End: 2020-12-08

## 2020-12-08 LAB
25(OH)D3 SERPL-MCNC: 49.1 NG/ML
ALBUMIN SERPL ELPH-MCNC: 5 G/DL
ALP BLD-CCNC: 54 U/L
ALT SERPL-CCNC: 13 U/L
ANION GAP SERPL CALC-SCNC: 11 MMOL/L
AST SERPL-CCNC: 20 U/L
BASOPHILS # BLD AUTO: 0.12 K/UL
BASOPHILS NFR BLD AUTO: 1.5 %
BILIRUB SERPL-MCNC: 0.3 MG/DL
BUN SERPL-MCNC: 14 MG/DL
CALCIUM SERPL-MCNC: 9.8 MG/DL
CHLORIDE SERPL-SCNC: 102 MMOL/L
CO2 SERPL-SCNC: 27 MMOL/L
CREAT SERPL-MCNC: 0.52 MG/DL
CRP SERPL-MCNC: <0.1 MG/DL
EOSINOPHIL # BLD AUTO: 0.28 K/UL
EOSINOPHIL NFR BLD AUTO: 3.5 %
ERYTHROCYTE [SEDIMENTATION RATE] IN BLOOD BY WESTERGREN METHOD: 2 MM/HR
ESTRADIOL SERPL-MCNC: 128 PG/ML
FOLATE SERPL-MCNC: >20 NG/ML
GLUCOSE SERPL-MCNC: 95 MG/DL
HCT VFR BLD CALC: 40.5 %
HGB BLD-MCNC: 12.5 G/DL
IMM GRANULOCYTES NFR BLD AUTO: 0.1 %
LYMPHOCYTES # BLD AUTO: 2.37 K/UL
LYMPHOCYTES NFR BLD AUTO: 29.3 %
MAGNESIUM SERPL-MCNC: 2 MG/DL
MAN DIFF?: NORMAL
MCHC RBC-ENTMCNC: 30.9 GM/DL
MCHC RBC-ENTMCNC: 32.3 PG
MCV RBC AUTO: 104.7 FL
MONOCYTES # BLD AUTO: 0.75 K/UL
MONOCYTES NFR BLD AUTO: 9.3 %
NEUTROPHILS # BLD AUTO: 4.55 K/UL
NEUTROPHILS NFR BLD AUTO: 56.3 %
PLATELET # BLD AUTO: 309 K/UL
POTASSIUM SERPL-SCNC: 5.1 MMOL/L
PROT SERPL-MCNC: 7.2 G/DL
RBC # BLD: 3.87 M/UL
RBC # FLD: 12.3 %
SODIUM SERPL-SCNC: 140 MMOL/L
T4 FREE SERPL-MCNC: 0.9 NG/DL
TSH SERPL-ACNC: 1.5 UIU/ML
VIT B12 SERPL-MCNC: 1238 PG/ML
WBC # FLD AUTO: 8.08 K/UL

## 2020-12-21 ENCOUNTER — APPOINTMENT (OUTPATIENT)
Dept: INTERNAL MEDICINE | Facility: CLINIC | Age: 30
End: 2020-12-21
Payer: COMMERCIAL

## 2020-12-21 ENCOUNTER — LABORATORY RESULT (OUTPATIENT)
Age: 30
End: 2020-12-21

## 2020-12-21 VITALS — TEMPERATURE: 97.9 F | HEART RATE: 130 BPM | OXYGEN SATURATION: 98 %

## 2020-12-21 VITALS
DIASTOLIC BLOOD PRESSURE: 60 MMHG | BODY MASS INDEX: 18.03 KG/M2 | WEIGHT: 97 LBS | SYSTOLIC BLOOD PRESSURE: 90 MMHG | HEART RATE: 105 BPM

## 2020-12-21 PROCEDURE — 99213 OFFICE O/P EST LOW 20 MIN: CPT

## 2020-12-21 PROCEDURE — 99072 ADDL SUPL MATRL&STAF TM PHE: CPT

## 2020-12-21 NOTE — HISTORY OF PRESENT ILLNESS
[de-identified] : The patient comes to follow-up regarding her weight.  She feels she is doing fairly well.  Weight is similar.  She is seen by her therapist and she has started to see a dietician who specializes in eating disorders.  No new symptoms.

## 2021-01-30 ENCOUNTER — TRANSCRIPTION ENCOUNTER (OUTPATIENT)
Age: 31
End: 2021-01-30

## 2021-02-02 ENCOUNTER — TRANSCRIPTION ENCOUNTER (OUTPATIENT)
Age: 31
End: 2021-02-02

## 2021-02-03 ENCOUNTER — APPOINTMENT (OUTPATIENT)
Dept: INTERNAL MEDICINE | Facility: CLINIC | Age: 31
End: 2021-02-03
Payer: COMMERCIAL

## 2021-02-03 VITALS
OXYGEN SATURATION: 100 % | BODY MASS INDEX: 17.85 KG/M2 | HEART RATE: 92 BPM | TEMPERATURE: 97.1 F | HEIGHT: 62 IN | WEIGHT: 97 LBS

## 2021-02-03 PROCEDURE — 99214 OFFICE O/P EST MOD 30 MIN: CPT | Mod: 25

## 2021-02-03 PROCEDURE — 36415 COLL VENOUS BLD VENIPUNCTURE: CPT

## 2021-02-03 PROCEDURE — 99072 ADDL SUPL MATRL&STAF TM PHE: CPT

## 2021-02-06 VITALS — SYSTOLIC BLOOD PRESSURE: 90 MMHG | DIASTOLIC BLOOD PRESSURE: 60 MMHG

## 2021-02-06 LAB
25(OH)D3 SERPL-MCNC: 45.4 NG/ML
ALBUMIN SERPL ELPH-MCNC: 4.7 G/DL
ALP BLD-CCNC: 57 U/L
ALT SERPL-CCNC: 11 U/L
ANION GAP SERPL CALC-SCNC: 14 MMOL/L
AST SERPL-CCNC: 19 U/L
BASOPHILS # BLD AUTO: 0.11 K/UL
BASOPHILS NFR BLD AUTO: 1.1 %
BILIRUB SERPL-MCNC: 0.3 MG/DL
BUN SERPL-MCNC: 13 MG/DL
CALCIUM SERPL-MCNC: 10.3 MG/DL
CHLORIDE SERPL-SCNC: 102 MMOL/L
CO2 SERPL-SCNC: 24 MMOL/L
CREAT SERPL-MCNC: 0.6 MG/DL
EOSINOPHIL # BLD AUTO: 0.43 K/UL
EOSINOPHIL NFR BLD AUTO: 4.5 %
ESTRADIOL SERPL-MCNC: 184 PG/ML
GLUCOSE SERPL-MCNC: 77 MG/DL
HCT VFR BLD CALC: 40.4 %
HGB BLD-MCNC: 13.3 G/DL
IMM GRANULOCYTES NFR BLD AUTO: 0.4 %
LYMPHOCYTES # BLD AUTO: 3.08 K/UL
LYMPHOCYTES NFR BLD AUTO: 32 %
MAGNESIUM SERPL-MCNC: 1.9 MG/DL
MAN DIFF?: NORMAL
MCHC RBC-ENTMCNC: 32.8 PG
MCHC RBC-ENTMCNC: 32.9 GM/DL
MCV RBC AUTO: 99.5 FL
MONOCYTES # BLD AUTO: 1.15 K/UL
MONOCYTES NFR BLD AUTO: 11.9 %
NEUTROPHILS # BLD AUTO: 4.83 K/UL
NEUTROPHILS NFR BLD AUTO: 50.1 %
PLATELET # BLD AUTO: 324 K/UL
POTASSIUM SERPL-SCNC: 4.7 MMOL/L
PROT SERPL-MCNC: 6.8 G/DL
RBC # BLD: 4.06 M/UL
RBC # FLD: 12 %
SODIUM SERPL-SCNC: 139 MMOL/L
T4 FREE SERPL-MCNC: 1 NG/DL
TSH SERPL-ACNC: 1.84 UIU/ML
WBC # FLD AUTO: 9.64 K/UL

## 2021-02-06 NOTE — PHYSICAL EXAM
[Normal] : soft, non-tender, non-distended, no masses palpated, no HSM and normal bowel sounds [de-identified] : thin female NAD

## 2021-02-06 NOTE — HISTORY OF PRESENT ILLNESS
[de-identified] : The patient comes in to follow-up regarding her eating disorder.  She needs a weight check.  She is seeing her therapist and now a nutritionist and she saw a psychiatrist who she has seen 2-3 times.  Her weight is about the same.  \par \par Medications adjusted- Sertraline increased to 125 mg with some improvement.  She uses Clonazepam 0.5-1 mg 3-4 times per week.  The anxiety is improved.

## 2021-02-06 NOTE — ASSESSMENT
[FreeTextEntry1] : She has anorexia nervosa and the weight is the same as her last visit.  She was counseled and she is seeing a therapist, nutritionist and psychiatrist as above.  She says she is feeling better on the medications as above.  \par \par COVID-19 vaccine advised when available.\par \par She has been advised to do a DEXA which she can do.\par \par Will check blood tests today- not checked last time.  \par \par Follow-up one month.  yes

## 2021-03-02 ENCOUNTER — APPOINTMENT (OUTPATIENT)
Dept: INTERNAL MEDICINE | Facility: CLINIC | Age: 31
End: 2021-03-02
Payer: COMMERCIAL

## 2021-03-02 VITALS
RESPIRATION RATE: 16 BRPM | BODY MASS INDEX: 18.16 KG/M2 | HEART RATE: 110 BPM | HEIGHT: 62 IN | TEMPERATURE: 98.3 F | DIASTOLIC BLOOD PRESSURE: 70 MMHG | WEIGHT: 98.7 LBS | SYSTOLIC BLOOD PRESSURE: 98 MMHG

## 2021-03-02 VITALS — HEART RATE: 92 BPM

## 2021-03-02 PROCEDURE — 99072 ADDL SUPL MATRL&STAF TM PHE: CPT

## 2021-03-02 PROCEDURE — 99214 OFFICE O/P EST MOD 30 MIN: CPT

## 2021-03-02 NOTE — HISTORY OF PRESENT ILLNESS
[de-identified] : The patient comes in for a monthly weight check.  She is eating and using a nutritional supplement.  She speaks to her therapist and nutritionist weekly and she is seeing her psychiatrist this week.  The anxiety is improved overall.  \par \par She lives with her boyfriend who is supportive.

## 2021-03-02 NOTE — ASSESSMENT
[FreeTextEntry1] : She is doing a little better overall- she has gained 1-2 pounds since the last visit.  She will continue to see the nutritionist, therapist and psychiatrist.  She is on Sertraline 125 mg and she might increase it to 150 mg.  She was counseled.  \par \par She had a sinus infection which has resolved and she had a left eye stye which is better now.  She had been prescribed Doxycycline which she hasn't taken.  It looks improved now so hold off on antibiotics unless it recurs.\par \par DEXA is pending.\par \par She will have the COVID-19 vaccine when available.  \par \par Follow-up one month.

## 2021-04-01 ENCOUNTER — APPOINTMENT (OUTPATIENT)
Dept: INTERNAL MEDICINE | Facility: CLINIC | Age: 31
End: 2021-04-01
Payer: COMMERCIAL

## 2021-04-01 VITALS
TEMPERATURE: 97.6 F | HEART RATE: 91 BPM | HEIGHT: 62 IN | BODY MASS INDEX: 18.4 KG/M2 | OXYGEN SATURATION: 98 % | WEIGHT: 100 LBS

## 2021-04-01 VITALS — SYSTOLIC BLOOD PRESSURE: 92 MMHG | DIASTOLIC BLOOD PRESSURE: 60 MMHG

## 2021-04-01 DIAGNOSIS — F41.9 ANXIETY DISORDER, UNSPECIFIED: ICD-10-CM

## 2021-04-01 PROCEDURE — 99213 OFFICE O/P EST LOW 20 MIN: CPT

## 2021-04-01 PROCEDURE — 99072 ADDL SUPL MATRL&STAF TM PHE: CPT

## 2021-04-01 NOTE — HISTORY OF PRESENT ILLNESS
[de-identified] : The patient comes in to follow-up.  Her diet has been ok and she has gained 1-2 pounds. She continues to see her therapist and nutritionist weekly and her psychiatrist monthly.  She has had more stress and anxiety recently.\par \par The sinus and allergy symptoms have been a little worse.

## 2021-04-01 NOTE — ASSESSMENT
[FreeTextEntry1] : The patient appears to be stable and her weight is up slightly.  She was counseled and she is seeing her therapist, nutritionist and psychiatrist as above.  There has been more anxiety- she is still on Sertraline 125 mg and  she could consider increasing the dose.  She uses Clonazepam QD-QOD.\par \par Her nutritionist might contact me regarding insurance coverage and a letter.\par \par She has had the first COVID-19 vaccine.\par \par She can follow-up with her allergist regarding the upper respiratory symptoms.\par \par She plans to do the DEXA.  \par \par Follow-up one month.

## 2021-04-12 ENCOUNTER — APPOINTMENT (OUTPATIENT)
Dept: RADIOLOGY | Facility: CLINIC | Age: 31
End: 2021-04-12
Payer: COMMERCIAL

## 2021-04-12 ENCOUNTER — RESULT REVIEW (OUTPATIENT)
Age: 31
End: 2021-04-12

## 2021-04-12 ENCOUNTER — OUTPATIENT (OUTPATIENT)
Dept: OUTPATIENT SERVICES | Facility: HOSPITAL | Age: 31
LOS: 1 days | End: 2021-04-12

## 2021-04-12 DIAGNOSIS — Z98.89 OTHER SPECIFIED POSTPROCEDURAL STATES: Chronic | ICD-10-CM

## 2021-04-12 PROCEDURE — 77085 DXA BONE DENSITY AXL VRT FX: CPT | Mod: 26

## 2021-04-16 ENCOUNTER — NON-APPOINTMENT (OUTPATIENT)
Age: 31
End: 2021-04-16

## 2021-05-07 ENCOUNTER — APPOINTMENT (OUTPATIENT)
Dept: INTERNAL MEDICINE | Facility: CLINIC | Age: 31
End: 2021-05-07
Payer: COMMERCIAL

## 2021-05-07 VITALS
HEIGHT: 61.5 IN | OXYGEN SATURATION: 98 % | HEART RATE: 84 BPM | WEIGHT: 97 LBS | BODY MASS INDEX: 18.08 KG/M2 | TEMPERATURE: 97.7 F

## 2021-05-07 PROCEDURE — 99072 ADDL SUPL MATRL&STAF TM PHE: CPT

## 2021-05-07 PROCEDURE — 99213 OFFICE O/P EST LOW 20 MIN: CPT

## 2021-05-08 VITALS — DIASTOLIC BLOOD PRESSURE: 60 MMHG | SYSTOLIC BLOOD PRESSURE: 100 MMHG

## 2021-05-08 NOTE — ASSESSMENT
[FreeTextEntry1] : Her weight is down 2-3 pounds since the last visit.  Her diet has not been as good with less po intake.  She was counseled.  She is aware of the decrease and she is trying to work on it.  She has support as above from her psychiatrist, therapist and dietician.  The Sertraline was increased to 150 mg by her psychiatrist.  Follow-up in a month and call sooner PRN.\par \par She has had the COVID-19 vaccine.

## 2021-05-08 NOTE — HISTORY OF PRESENT ILLNESS
[de-identified] : The patient comes in to follow-up to check the weight.  She admits her diet has not been good in the last few weeks and she may have slipped back in terms of her po intake.  There has been some stress at work.  She continues to see her therapist, psychiatrist and dietician.

## 2021-05-17 PROBLEM — Z00.00 ENCOUNTER FOR PREVENTIVE HEALTH EXAMINATION: Noted: 2021-05-17

## 2021-05-19 ENCOUNTER — APPOINTMENT (OUTPATIENT)
Dept: PEDIATRIC ALLERGY IMMUNOLOGY | Facility: CLINIC | Age: 31
End: 2021-05-19
Payer: COMMERCIAL

## 2021-05-19 VITALS
OXYGEN SATURATION: 97 % | TEMPERATURE: 98.1 F | HEART RATE: 82 BPM | HEIGHT: 61.5 IN | SYSTOLIC BLOOD PRESSURE: 109 MMHG | DIASTOLIC BLOOD PRESSURE: 73 MMHG

## 2021-05-19 DIAGNOSIS — J30.89 OTHER ALLERGIC RHINITIS: ICD-10-CM

## 2021-05-19 DIAGNOSIS — J33.9 NASAL POLYP, UNSPECIFIED: ICD-10-CM

## 2021-05-19 DIAGNOSIS — J45.30 MILD PERSISTENT ASTHMA, UNCOMPLICATED: ICD-10-CM

## 2021-05-19 PROCEDURE — 99214 OFFICE O/P EST MOD 30 MIN: CPT

## 2021-05-19 PROCEDURE — 99072 ADDL SUPL MATRL&STAF TM PHE: CPT

## 2021-05-19 RX ORDER — BUDESONIDE 90 UG/1
90 AEROSOL, POWDER RESPIRATORY (INHALATION)
Qty: 1 | Refills: 5 | Status: ACTIVE | COMMUNITY
Start: 2017-10-09 | End: 1900-01-01

## 2021-05-19 RX ORDER — ALBUTEROL SULFATE 90 UG/1
108 (90 BASE) INHALANT RESPIRATORY (INHALATION)
Qty: 3 | Refills: 5 | Status: ACTIVE | COMMUNITY
Start: 2019-05-07 | End: 1900-01-01

## 2021-05-24 PROBLEM — J30.89 ALLERGIC RHINITIS DUE TO DUST MITE: Status: ACTIVE | Noted: 2017-11-13

## 2021-05-24 PROBLEM — J45.30 MILD PERSISTENT ASTHMA WITHOUT COMPLICATION: Status: ACTIVE | Noted: 2017-10-09

## 2021-05-24 NOTE — SOCIAL HISTORY
[Apartment] : [unfilled] lives in an apartment  [None] : none [de-identified] : just moved to [place] [de-identified] : pet dog in her parents' house in

## 2021-05-24 NOTE — CONSULT LETTER
[Dear  ___] : Dear  [unfilled], [Courtesy Letter:] : I had the pleasure of seeing your patient, [unfilled], in my office today. [Please see my note below.] : Please see my note below. [Consult Closing:] : Thank you very much for allowing me to participate in the care of this patient.  If you have any questions, please do not hesitate to contact me. [Sincerely,] : Sincerely, [FreeTextEntry2] : DIANA HOLLAND [FreeTextEntry3] : Leah Dominguez MD\par Attending Physician \par Division of Allergy/Immunology \par [facility] \par \par  of Medicine and Pediatrics\par [facility] \par \par [address]\[place] [address]\par Tel: [phone]\par Fax: [phone]\par Email: [email or web address]\par \par \par \par

## 2021-05-24 NOTE — HISTORY OF PRESENT ILLNESS
[de-identified] : Varsha is a 30 year old girl with a history of asthma, allergic rhinoconjunctivitis and drug allergy who is here for follow-up.  She was last seen in May 2019.\par \par Since her last visit she has had a few medical issues. Currently in an eating disorder program. Also diagnosed with a nasal poly/retention cyst in Feb 2020. Referred back to A/I by ENT but did not follow-up due to COVID.\par \par Chronic nasal congestion, mouth breather, more nasal voice. Blows her nose constantly. Some rhinorrhea and post-nasal drip.\par Zyrtec 10mg daily, tried flonase but got nosebleeds, then switched to mometasone; now takes this a few times a week. \par Episodically has intense sinus pressure, facial pain, yellow drainage from her nose. Lasts about a week and self-resolves. No fever. Avoids taking abx due to PCN allergy.  Cannot get off of zyrtec to do PCN testing/challenge.\par Last abx was about a year ago in urgent care.\par She was given a course of OCS "just in case" by her ENT doc but she never took it.\par Tolerates NSAIDs.\par No bronchitis, pneumonia.\par No IV abx for infection.\par Mono in college.\par \par Asthma - off pulmicort since Feb 2021. Ran out and just stopped taking it.\par No nocturnal cough. Not active so unclear if she has activity limitatation.\par No wheezing.\par Uses albuterol max once a week.\par \par Jan 2020:\par She stopped taking her pulmicort about 3 months ago. She has had nocturnal cough that wakes her up at least once a week. Also has some activity limitation. Takes albuterol once a week or more if she is doing strenuous activity or is sick.\par Started a nasal steroid spray - mometasone. No nosebleeds but dry nose. Not much improvement. Seen by ENT and planned for deviated septum repair. \par Takes zyrtec every night. \par She has taken 1 course of abx this winter. Had a sinus infection. \par \par No known allergy to ibuprofen or naproxen. Thinks she took aspirin at some point. Has also tolerated tylenol 1,000mg.\par \par May 2019:\par No asthma flare-ups. Interested in switching rescue inhaler. Currently uses ventolin. Wants to switch to Proair - used her friends once and felt much better.  Does not use frequently. rarely uses a a spacer. Forgets pulmicort dose about once a week. No ED visits, no OCS use. Does pilates and barre with no issues but these are more isometric exercises, not aerobic.\par Since pollen season started she has had nasal congestion, no ocular symptoms. Uses eye drops PRN. Insurance did not cover Zaditor. Takes zyrtec everyday and if she misses a dose her nasal symptoms worsen. \par \par September 2018:\par Immunocaps previously positive to tree, dog and dust mite.  \par She continues to take cetirizine daily and Flonase PRN.  Takes zyrtec year round.   If she misses a dose of zyrtec she begins to feel allergy symptoms and develop hives.  She takes Flonase only rarely bt states that she should take it more because she is frequently congested.\par Zaditor - uses it sporadically.  Ocular symptoms are not really an issue for her unless she spends extended time with her family dog. \par She has a pet dog at her parents' home in  but she just moved to the city where she has no pets.  The dog makes her sneeze and sometimes have itchy eyes. \par \par Drug allergy - she is avoiding penicillin antibiotics and tetracyclines.  Skin testing previously deferred due to use of oral antihistamines and benzodiazepines. \par Takes zantac daily for heartburn.\par \par Asthma - she continues on pulmicort flexhaler 90mcg/puff.  She has been taking 2 puffs once a day and rinses her mouth afterwrds.  She feels well.  No nocturnal cough. No activity limitation. She does barre but does not do anything aerobic. \par Last ventolin use was last week during very humid conditions. \par Takes Ventolin about once a week\par Triggers include humidity and very cold, dry air.  Using a scarf around her mouth helps minimize exposure to cold air.

## 2021-07-09 LAB
BASOPHILS # BLD AUTO: 0.09 K/UL
BASOPHILS NFR BLD AUTO: 1.1 %
C DIPHTHERIAE AB SER QL: 0.19 IU/ML
C TETANI IGG SER-ACNC: 0.89 IU/ML
CD16+CD56+ CELLS # BLD: 231 /UL
CD16+CD56+ CELLS NFR BLD: 11 %
CD19 CELLS NFR BLD: 143 /UL
CD3 CELLS # BLD: 1649 /UL
CD3 CELLS NFR BLD: 80 %
CD3+CD4+ CELLS # BLD: 1314 /UL
CD3+CD4+ CELLS NFR BLD: 62 %
CD3+CD4+ CELLS/CD3+CD8+ CLL SPEC: 4.12 RATIO
CD3+CD8+ CELLS # SPEC: 319 /UL
CD3+CD8+ CELLS NFR BLD: 15 %
CELLS.CD3-CD19+/CELLS IN BLOOD: 7 %
CH50 SERPL-MCNC: 74 U/ML
DEPRECATED KAPPA LC FREE/LAMBDA SER: 1.04 RATIO
DEPRECATED S PNEUM 1 IGG SER-MCNC: 13.2 MCG/ML
DEPRECATED S PNEUM12 AB SER-ACNC: 3.8 MCG/ML
DEPRECATED S PNEUM14 AB SER-ACNC: 6.2 MCG/ML
DEPRECATED S PNEUM17 IGG SER IA-MCNC: 33.7 MCG/ML
DEPRECATED S PNEUM18 IGG SER IA-MCNC: 1 MCG/ML
DEPRECATED S PNEUM19 IGG SER-MCNC: 16.2 MCG/ML
DEPRECATED S PNEUM19 IGG SER-MCNC: 9.6 MCG/ML
DEPRECATED S PNEUM2 IGG SER-MCNC: 4.5 MCG/ML
DEPRECATED S PNEUM20 IGG SER-MCNC: 4.6 MCG/ML
DEPRECATED S PNEUM22 IGG SER-MCNC: 40.6 MCG/ML
DEPRECATED S PNEUM23 AB SER-ACNC: 54.4 MCG/ML
DEPRECATED S PNEUM3 AB SER-ACNC: 7.4 MCG/ML
DEPRECATED S PNEUM34 IGG SER-MCNC: 17 MCG/ML
DEPRECATED S PNEUM4 AB SER-ACNC: 1.3 MCG/ML
DEPRECATED S PNEUM5 IGG SER-MCNC: 12.7 MCG/ML
DEPRECATED S PNEUM6 IGG SER-MCNC: 11.3 MCG/ML
DEPRECATED S PNEUM7 IGG SER-ACNC: 24.3 MCG/ML
DEPRECATED S PNEUM8 AB SER-ACNC: 7.8 MCG/ML
DEPRECATED S PNEUM9 AB SER-ACNC: NORMAL
DEPRECATED S PNEUM9 IGG SER-MCNC: 3.9 MCG/ML
EOSINOPHIL # BLD AUTO: 0.23 K/UL
EOSINOPHIL NFR BLD AUTO: 2.9 %
HAEM INFLU B AB SER-MCNC: 4.56 UG/ML
HCT VFR BLD CALC: 42 %
HGB BLD-MCNC: 13.6 G/DL
IGA SER QL IEP: 248 MG/DL
IGE SER-MCNC: 45 KU/L
IGG SER QL IEP: 1049 MG/DL
IGM SER QL IEP: 149 MG/DL
IMM GRANULOCYTES NFR BLD AUTO: 0.3 %
KAPPA LC CSF-MCNC: 1.19 MG/DL
KAPPA LC SERPL-MCNC: 1.24 MG/DL
LEUKOTRIENE E4, URINE: 33 CD:455662145
LYMPHOCYTES # BLD AUTO: 2.23 K/UL
LYMPHOCYTES NFR BLD AUTO: 28.3 %
MAN DIFF?: NORMAL
MANNAN BINDING LECTIN (MBL): 3025 NG/ML
MCHC RBC-ENTMCNC: 32.2 PG
MCHC RBC-ENTMCNC: 32.4 GM/DL
MCV RBC AUTO: 99.5 FL
MEV IGG FLD QL IA: >300 AU/ML
MEV IGG+IGM SER-IMP: POSITIVE
MONOCYTES # BLD AUTO: 0.73 K/UL
MONOCYTES NFR BLD AUTO: 9.3 %
MUV AB SER-ACNC: POSITIVE
MUV IGG SER QL IA: 90.7 AU/ML
NEUTROPHILS # BLD AUTO: 4.58 K/UL
NEUTROPHILS NFR BLD AUTO: 58.1 %
PLATELET # BLD AUTO: 333 K/UL
RBC # BLD: 4.22 M/UL
RBC # FLD: 11.9 %
RUBV IGG FLD-ACNC: 2.4 INDEX
RUBV IGG SER-IMP: POSITIVE
STREPTOCOCCUS PNEUMONIAE SEROTYPE 11A: 2.4 MCG/ML
STREPTOCOCCUS PNEUMONIAE SEROTYPE 15B: 5 MCG/ML
STREPTOCOCCUS PNEUMONIAE SEROTYPE 33F: 6.4 MCG/ML
WBC # FLD AUTO: 7.88 K/UL

## 2022-02-07 ENCOUNTER — APPOINTMENT (OUTPATIENT)
Dept: INTERNAL MEDICINE | Facility: CLINIC | Age: 32
End: 2022-02-07
Payer: COMMERCIAL

## 2022-02-07 VITALS — HEART RATE: 115 BPM | TEMPERATURE: 98 F | BODY MASS INDEX: 19.33 KG/M2 | OXYGEN SATURATION: 98 % | WEIGHT: 104 LBS

## 2022-02-07 DIAGNOSIS — D72.829 ELEVATED WHITE BLOOD CELL COUNT, UNSPECIFIED: ICD-10-CM

## 2022-02-07 DIAGNOSIS — R63.4 ABNORMAL WEIGHT LOSS: ICD-10-CM

## 2022-02-07 PROCEDURE — 99213 OFFICE O/P EST LOW 20 MIN: CPT | Mod: 25

## 2022-02-07 PROCEDURE — 36415 COLL VENOUS BLD VENIPUNCTURE: CPT

## 2022-02-11 LAB
25(OH)D3 SERPL-MCNC: 48.9 NG/ML
ALBUMIN SERPL ELPH-MCNC: 5 G/DL
ALP BLD-CCNC: 61 U/L
ALT SERPL-CCNC: 13 U/L
ANION GAP SERPL CALC-SCNC: 17 MMOL/L
AST SERPL-CCNC: 20 U/L
BASOPHILS # BLD AUTO: 0.08 K/UL
BASOPHILS NFR BLD AUTO: 0.8 %
BILIRUB SERPL-MCNC: 0.3 MG/DL
BUN SERPL-MCNC: 15 MG/DL
CALCIUM SERPL-MCNC: 10.1 MG/DL
CHLORIDE SERPL-SCNC: 104 MMOL/L
CHOLEST SERPL-MCNC: 180 MG/DL
CO2 SERPL-SCNC: 21 MMOL/L
CREAT SERPL-MCNC: 0.52 MG/DL
EOSINOPHIL # BLD AUTO: 0.28 K/UL
EOSINOPHIL NFR BLD AUTO: 3 %
ESTIMATED AVERAGE GLUCOSE: 91 MG/DL
ESTRADIOL SERPL-MCNC: 38 PG/ML
FERRITIN SERPL-MCNC: 29 NG/ML
FSH SERPL-MCNC: 9.3 IU/L
GLUCOSE SERPL-MCNC: 83 MG/DL
HBA1C MFR BLD HPLC: 4.8 %
HCT VFR BLD CALC: 39.2 %
HDLC SERPL-MCNC: 77 MG/DL
HGB BLD-MCNC: 12.7 G/DL
IMM GRANULOCYTES NFR BLD AUTO: 0.3 %
IRON SATN MFR SERPL: 31 %
IRON SERPL-MCNC: 103 UG/DL
LDLC SERPL CALC-MCNC: 90 MG/DL
LH SERPL-ACNC: 15 IU/L
LYMPHOCYTES # BLD AUTO: 2.33 K/UL
LYMPHOCYTES NFR BLD AUTO: 24.6 %
MAN DIFF?: NORMAL
MCHC RBC-ENTMCNC: 32.2 PG
MCHC RBC-ENTMCNC: 32.4 GM/DL
MCV RBC AUTO: 99.5 FL
MONOCYTES # BLD AUTO: 0.73 K/UL
MONOCYTES NFR BLD AUTO: 7.7 %
NEUTROPHILS # BLD AUTO: 6.04 K/UL
NEUTROPHILS NFR BLD AUTO: 63.6 %
NONHDLC SERPL-MCNC: 103 MG/DL
PLATELET # BLD AUTO: 299 K/UL
POTASSIUM SERPL-SCNC: 4.7 MMOL/L
PROT SERPL-MCNC: 7.5 G/DL
RBC # BLD: 3.94 M/UL
RBC # FLD: 12.6 %
SODIUM SERPL-SCNC: 142 MMOL/L
T4 FREE SERPL-MCNC: 0.9 NG/DL
TIBC SERPL-MCNC: 331 UG/DL
TRIGL SERPL-MCNC: 65 MG/DL
TSH SERPL-ACNC: 1.28 UIU/ML
UIBC SERPL-MCNC: 227 UG/DL
VIT B12 SERPL-MCNC: 888 PG/ML
WBC # FLD AUTO: 9.49 K/UL

## 2022-02-13 VITALS — DIASTOLIC BLOOD PRESSURE: 70 MMHG | SYSTOLIC BLOOD PRESSURE: 100 MMHG

## 2022-02-13 NOTE — HISTORY OF PRESENT ILLNESS
[de-identified] : The patient comes in to check blood tests requested by her dietician.  The patient says she is doing better with her weight but she doesn't know her weight.  She sees her dietician and she speaks to her therapist and her psychiatrist every 6-8 weeks.  Periods are irregular.

## 2022-02-13 NOTE — ASSESSMENT
[FreeTextEntry1] : The patient appears to be doing fairly well and she was counseled.  She sees the specialists as above and is monitored closely.  Check blood tests as per dietician's request.\par \par She has an IUD and she was reminded to see her OBGYN.  \par \par She has had the COVID-19 booster and flu vaccine.

## 2022-04-11 PROBLEM — Z11.59 SCREENING FOR VIRAL DISEASE: Status: ACTIVE | Noted: 2020-11-23

## 2022-07-20 ENCOUNTER — NON-APPOINTMENT (OUTPATIENT)
Age: 32
End: 2022-07-20

## 2022-07-28 ENCOUNTER — LABORATORY RESULT (OUTPATIENT)
Age: 32
End: 2022-07-28

## 2022-07-28 ENCOUNTER — APPOINTMENT (OUTPATIENT)
Dept: INTERNAL MEDICINE | Facility: CLINIC | Age: 32
End: 2022-07-28

## 2022-07-28 VITALS — HEART RATE: 76 BPM | DIASTOLIC BLOOD PRESSURE: 60 MMHG | SYSTOLIC BLOOD PRESSURE: 90 MMHG

## 2022-07-28 VITALS — DIASTOLIC BLOOD PRESSURE: 55 MMHG | SYSTOLIC BLOOD PRESSURE: 90 MMHG | HEART RATE: 66 BPM

## 2022-07-28 VITALS
OXYGEN SATURATION: 99 % | TEMPERATURE: 98 F | WEIGHT: 105 LBS | BODY MASS INDEX: 19.32 KG/M2 | HEART RATE: 99 BPM | HEIGHT: 62 IN

## 2022-07-28 DIAGNOSIS — Z00.00 ENCOUNTER FOR GENERAL ADULT MEDICAL EXAMINATION W/OUT ABNORMAL FINDINGS: ICD-10-CM

## 2022-07-28 DIAGNOSIS — F50.00 ANOREXIA NERVOSA, UNSPECIFIED: ICD-10-CM

## 2022-07-28 PROCEDURE — 99214 OFFICE O/P EST MOD 30 MIN: CPT | Mod: 25

## 2022-07-28 PROCEDURE — 36415 COLL VENOUS BLD VENIPUNCTURE: CPT

## 2022-07-28 RX ORDER — KETOTIFEN FUMARATE 0.25 MG/ML
0.03 SOLUTION OPHTHALMIC
Qty: 1 | Refills: 2 | Status: DISCONTINUED | COMMUNITY
Start: 2017-10-09 | End: 2022-07-28

## 2022-07-28 RX ORDER — ALBUTEROL SULFATE 90 UG/1
108 (90 BASE) INHALANT RESPIRATORY (INHALATION)
Qty: 1 | Refills: 3 | Status: DISCONTINUED | COMMUNITY
Start: 2021-05-19 | End: 2022-07-28

## 2022-07-28 RX ORDER — ESCITALOPRAM OXALATE 20 MG/1
20 TABLET ORAL
Refills: 0 | Status: ACTIVE | COMMUNITY
Start: 2022-06-21

## 2022-07-28 NOTE — ASSESSMENT
[FreeTextEntry1] : The patient is entering an intensive outpatient eating disorders program which is going to be virtual.  She has ongoing symptoms and has not improved.  She continues to see her therapist and psychiatrist.  She is now on Lexapro.   She was counseled.\par \par Will complete the form.  Blood and urine tests done.  She doesn't need TB, COVID-19 or serology screening done.\par \par She has had the COVID-19 vaccine booster.  Recent symptoms resolved.

## 2022-07-28 NOTE — HISTORY OF PRESENT ILLNESS
[de-identified] : The patient is starting an eating disorders intensive outpatient  program- [facility].  She needs a medical evaluation and blood and urine tests prior to entering.  She has had anxiety and ongoing eating disorder difficulty.  She has had difficulty functioning at work and she is taking a leave.  She still sees her therapist and dietician.  She can feel overwhelmed.\par \par She is also under stress because her lease is ending and she has to move back to [place] for financial reasons.  She is now engaged to her boyfriend.  \par \par The previous symptoms from 1-2 weeks ago have resolved.  No leg or arm symptoms now.  She still has mild nasal and sinus congestion from the recent COVID-19.

## 2022-08-10 ENCOUNTER — NON-APPOINTMENT (OUTPATIENT)
Age: 32
End: 2022-08-10

## 2022-08-14 LAB
ALBUMIN SERPL ELPH-MCNC: 4.8 G/DL
ALP BLD-CCNC: 52 U/L
ALT SERPL-CCNC: 10 U/L
AMPHET UR-MCNC: NEGATIVE
AMYLASE/CREAT SERPL: 49 U/L
ANION GAP SERPL CALC-SCNC: 13 MMOL/L
AST SERPL-CCNC: 18 U/L
BARBITURATES UR-MCNC: NEGATIVE
BASOPHILS # BLD AUTO: 0.07 K/UL
BASOPHILS NFR BLD AUTO: 1 %
BENZODIAZ UR-MCNC: NEGATIVE
BILIRUB SERPL-MCNC: 0.2 MG/DL
BUN SERPL-MCNC: 11 MG/DL
CALCIUM SERPL-MCNC: 9.6 MG/DL
CHLORIDE SERPL-SCNC: 103 MMOL/L
CO2 SERPL-SCNC: 24 MMOL/L
COCAINE METAB.OTHER UR-MCNC: NEGATIVE
CREAT SERPL-MCNC: 0.63 MG/DL
CREATININE, URINE: 18.7 MG/DL
EGFR: 123 ML/MIN/1.73M2
EOSINOPHIL # BLD AUTO: 0.22 K/UL
EOSINOPHIL NFR BLD AUTO: 3 %
GLUCOSE SERPL-MCNC: 88 MG/DL
HCG SERPL QL: NEGATIVE
HCT VFR BLD CALC: 42.1 %
HGB BLD-MCNC: 13.2 G/DL
IMM GRANULOCYTES NFR BLD AUTO: 0.3 %
LYMPHOCYTES # BLD AUTO: 2.3 K/UL
LYMPHOCYTES NFR BLD AUTO: 31.3 %
MAGNESIUM SERPL-MCNC: 2.1 MG/DL
MAN DIFF?: NORMAL
MCHC RBC-ENTMCNC: 31.4 GM/DL
MCHC RBC-ENTMCNC: 31.9 PG
MCV RBC AUTO: 101.7 FL
METHADONE UR-MCNC: NEGATIVE
METHAQUALONE UR-MCNC: NEGATIVE
MONOCYTES # BLD AUTO: 0.48 K/UL
MONOCYTES NFR BLD AUTO: 6.5 %
NEUTROPHILS # BLD AUTO: 4.25 K/UL
NEUTROPHILS NFR BLD AUTO: 57.9 %
OPIATES UR-MCNC: NEGATIVE
PAPP-A SERPL-ACNC: <1 MIU/ML
PCP UR-MCNC: NEGATIVE
PHOSPHATE SERPL-MCNC: 3.3 MG/DL
PLATELET # BLD AUTO: 282 K/UL
POTASSIUM SERPL-SCNC: 4.8 MMOL/L
PROPOXYPH UR QL: NEGATIVE
PROT SERPL-MCNC: 7.1 G/DL
RBC # BLD: 4.14 M/UL
RBC # FLD: 12.2 %
SODIUM SERPL-SCNC: 141 MMOL/L
THC UR QL: NORMAL
WBC # FLD AUTO: 7.34 K/UL

## 2022-10-07 ENCOUNTER — APPOINTMENT (OUTPATIENT)
Dept: OTOLARYNGOLOGY | Facility: CLINIC | Age: 32
End: 2022-10-07

## 2022-10-07 VITALS
BODY MASS INDEX: 19.32 KG/M2 | HEART RATE: 104 BPM | WEIGHT: 105 LBS | SYSTOLIC BLOOD PRESSURE: 109 MMHG | DIASTOLIC BLOOD PRESSURE: 70 MMHG | HEIGHT: 62 IN | TEMPERATURE: 98.2 F

## 2022-10-07 DIAGNOSIS — J32.0 CHRONIC MAXILLARY SINUSITIS: ICD-10-CM

## 2022-10-07 DIAGNOSIS — J30.2 OTHER SEASONAL ALLERGIC RHINITIS: ICD-10-CM

## 2022-10-07 DIAGNOSIS — S09.92XA UNSPECIFIED INJURY OF NOSE, INITIAL ENCOUNTER: ICD-10-CM

## 2022-10-07 DIAGNOSIS — J34.2 DEVIATED NASAL SEPTUM: ICD-10-CM

## 2022-10-07 DIAGNOSIS — R09.89 OTHER SPECIFIED SYMPTOMS AND SIGNS INVOLVING THE CIRCULATORY AND RESPIRATORY SYSTEMS: ICD-10-CM

## 2022-10-07 PROCEDURE — 31231 NASAL ENDOSCOPY DX: CPT

## 2022-10-07 PROCEDURE — 99204 OFFICE O/P NEW MOD 45 MIN: CPT | Mod: 25

## 2022-10-07 NOTE — CONSULT LETTER
[Dear  ___] : Dear  [unfilled], [Consult Letter:] : I had the pleasure of evaluating your patient, [unfilled]. [Please see my note below.] : Please see my note below. [Consult Closing:] : Thank you very much for allowing me to participate in the care of this patient.  If you have any questions, please do not hesitate to contact me. [Sincerely,] : Sincerely, [FreeTextEntry3] : Jagdish Pierre MD\par [facility]\par Otolaryngology and Facial Plastics\par [employer], [employer]\par

## 2022-10-07 NOTE — HISTORY OF PRESENT ILLNESS
[de-identified] : patient has been told in the past that she has sinus issues and has constant nasal congestion. She was then hit in th nose by her dog by accident three days ago and has had sinus pressure, nasal congestion worsening and tenderness over the nose. SHe cant blow her nose and get anything out. She does have environmental allergies based on past testing. She uses Zyrtec daily and uses a prescribed nasal spray. She did not have any nosebleeds but she did note some clear thin runny nose since she got hit. She has a lot of postnasal drip as her baseline but its been worse since she was hit in the nose. \par She gets antibiotics for sinus infections about 3 times a year but experiences pressure and sinus aching every month and takes Sudafed for it. \par

## 2022-10-07 NOTE — ASSESSMENT
[FreeTextEntry1] : Patient 31-year-old female diagnosed with her daughter resulted in ecchymosis and pain on examination there is ecchymosis Barton on the right nose tender on palpation no evidence of any displaced fracture he has a long history of sinus issues and treatment for 3-4 times a year with antibiotics endoscopically she has a deviated septum to her right no CAT scan November 2020 was reviewed showed a large cyst in her left maxillary sinus impinging on the medial wall of her maxillary sinuses.  We decided to repeat the CAT scan and most likely I will encourage her to proceed with a septoplasty and removal of the cyst depending on the condition of it if it still present.

## 2022-10-07 NOTE — END OF VISIT
[FreeTextEntry3] : I, Dr. Pierre personally performed the evaluation and management (E/M) services including all necessary procedures, for this new patient. That E/M includes conducting the clinically appropriate initial history &/or exam, assessing all conditions, and establishing the plan of care. Today, my IAM, Laurie Trinh, was here to observe &/or participate in the visit & follow plan of care established by me.\par \par \par

## 2022-10-07 NOTE — REVIEW OF SYSTEMS
[Seasonal Allergies] : seasonal allergies [Post Nasal Drip] : post nasal drip [Nasal Congestion] : nasal congestion [Recurrent Sinus Infections] : recurrent sinus infections [Sinus Pain] : sinus pain [Sinus Pressure] : sinus pressure [Negative] : Heme/Lymph

## 2022-10-10 ENCOUNTER — APPOINTMENT (OUTPATIENT)
Dept: OTOLARYNGOLOGY | Facility: CLINIC | Age: 32
End: 2022-10-10

## 2022-10-11 ENCOUNTER — LABORATORY RESULT (OUTPATIENT)
Age: 32
End: 2022-10-11

## 2022-10-11 ENCOUNTER — APPOINTMENT (OUTPATIENT)
Dept: INTERNAL MEDICINE | Facility: CLINIC | Age: 32
End: 2022-10-11

## 2022-10-15 ENCOUNTER — OUTPATIENT (OUTPATIENT)
Dept: OUTPATIENT SERVICES | Facility: HOSPITAL | Age: 32
LOS: 1 days | End: 2022-10-15
Payer: COMMERCIAL

## 2022-10-15 ENCOUNTER — APPOINTMENT (OUTPATIENT)
Dept: CT IMAGING | Facility: CLINIC | Age: 32
End: 2022-10-15

## 2022-10-15 DIAGNOSIS — Z98.89 OTHER SPECIFIED POSTPROCEDURAL STATES: Chronic | ICD-10-CM

## 2022-10-15 DIAGNOSIS — J32.0 CHRONIC MAXILLARY SINUSITIS: ICD-10-CM

## 2022-10-15 DIAGNOSIS — Z00.8 ENCOUNTER FOR OTHER GENERAL EXAMINATION: ICD-10-CM

## 2022-10-15 PROCEDURE — 70486 CT MAXILLOFACIAL W/O DYE: CPT | Mod: 26

## 2022-10-15 PROCEDURE — 70486 CT MAXILLOFACIAL W/O DYE: CPT

## 2022-10-17 ENCOUNTER — NON-APPOINTMENT (OUTPATIENT)
Age: 32
End: 2022-10-17

## 2022-11-23 ENCOUNTER — LABORATORY RESULT (OUTPATIENT)
Age: 32
End: 2022-11-23

## 2023-07-19 ENCOUNTER — APPOINTMENT (OUTPATIENT)
Dept: OBGYN | Facility: CLINIC | Age: 33
End: 2023-07-19
Payer: COMMERCIAL

## 2023-07-19 PROCEDURE — 76857 US EXAM PELVIC LIMITED: CPT

## 2023-07-19 PROCEDURE — 76830 TRANSVAGINAL US NON-OB: CPT

## 2023-11-26 ENCOUNTER — NON-APPOINTMENT (OUTPATIENT)
Age: 33
End: 2023-11-26

## 2023-12-01 ENCOUNTER — APPOINTMENT (OUTPATIENT)
Dept: ORTHOPEDIC SURGERY | Facility: CLINIC | Age: 33
End: 2023-12-01
Payer: COMMERCIAL

## 2023-12-01 VITALS — WEIGHT: 105 LBS | HEIGHT: 62 IN | BODY MASS INDEX: 19.32 KG/M2

## 2023-12-01 PROCEDURE — 99204 OFFICE O/P NEW MOD 45 MIN: CPT

## 2023-12-01 PROCEDURE — 27200 TREAT TAIL BONE FRACTURE: CPT

## 2023-12-01 PROCEDURE — 72220 X-RAY EXAM SACRUM TAILBONE: CPT

## 2024-01-03 ENCOUNTER — APPOINTMENT (OUTPATIENT)
Dept: ORTHOPEDIC SURGERY | Facility: CLINIC | Age: 34
End: 2024-01-03
Payer: COMMERCIAL

## 2024-01-03 VITALS
HEART RATE: 97 BPM | BODY MASS INDEX: 19.32 KG/M2 | HEIGHT: 62 IN | SYSTOLIC BLOOD PRESSURE: 113 MMHG | WEIGHT: 105 LBS | DIASTOLIC BLOOD PRESSURE: 75 MMHG

## 2024-01-03 DIAGNOSIS — S32.2XXA FRACTURE OF COCCYX, INITIAL ENCOUNTER FOR CLOSED FRACTURE: ICD-10-CM

## 2024-01-03 PROCEDURE — 99024 POSTOP FOLLOW-UP VISIT: CPT

## 2024-01-03 NOTE — HISTORY OF PRESENT ILLNESS
[de-identified] : Chief Complaint: Coccyx pain   History of Present Illness: 33-year-old female presenting for 1 month follow-up visit.  During her last visit she was diagnosed with a nondisplaced coccyx fracture difficulty sitting.  She has been using a cushion and a donut to sit she states that her symptoms are improving she is able to sit straight up but only for short periods now but she does notice a significant proved over the past several weeks.  She is starting noticed that she is having some low back discomfort secondary to having to constantly shift and sit on 1 side.  She would like to address the low back pain moving forward but she believes the coccyx pain is improving and so far is happy with her current treatment plan happy with her current management.   Past medical history, past surgical history, medications, allergies, social history, and family history are as documented in our records today.  Notable items include: None   Review of Systems: I have reviewed the patient's documented Review of Systems data today, I concur with this documentation.

## 2024-01-03 NOTE — REASON FOR VISIT
[Follow-Up Visit] : a follow-up visit for [Back Pain] : back pain [No Fault] : this visit is related to no fault

## 2024-01-03 NOTE — DISCUSSION/SUMMARY
[de-identified] : She will continue her sitting restrictions using a doughnut or cushions until pain resolves She has no restrictions she can be weightbearing as tolerated Physical therapy ordered Two to 3 times per week for 6 to 8 weeks to focus on her low back pain I will see her back on an as-needed basis if her symptoms fail to completely resolved and we will obtain a sacral MRI and consider possible injection however I do not believe that this will be the case

## 2024-04-03 ENCOUNTER — APPOINTMENT (OUTPATIENT)
Dept: ORTHOPEDIC SURGERY | Facility: CLINIC | Age: 34
End: 2024-04-03
Payer: COMMERCIAL

## 2024-04-03 ENCOUNTER — NON-APPOINTMENT (OUTPATIENT)
Age: 34
End: 2024-04-03

## 2024-04-03 DIAGNOSIS — M25.571 PAIN IN RIGHT ANKLE AND JOINTS OF RIGHT FOOT: ICD-10-CM

## 2024-04-03 PROCEDURE — 73610 X-RAY EXAM OF ANKLE: CPT | Mod: RT

## 2024-04-03 PROCEDURE — 99214 OFFICE O/P EST MOD 30 MIN: CPT

## 2024-04-03 NOTE — HISTORY OF PRESENT ILLNESS
[FreeTextEntry1] : The patient is a 33-year-old female who presents with right ankle pain.  She injured her right ankle on 3/9/2024 after she was wearing higher shoes and accidently rolled her ankle (inversion).  Has a history of ankle rolls in the past.  Patient went to an Ortho Urgent Care on 3/10/24, wearing a long cam boot since then and has been in PT for one session.  She is here for a follow-up and for second opinion.  Patient states that she has lost trust in her ankle.  She needs the boot to stabilize her right ankle.  It does cause her moderate to severe pain with weightbearing.  Minimal pain at rest.  No locking or catching of the right ankle.  No numbness or tingling.

## 2024-04-03 NOTE — PHYSICAL EXAM
[de-identified] : Right ankle Physical Examination:  General: Alert and oriented x3.  In no acute distress.  Pleasant in nature with a normal affect.  No apparent respiratory distress.  Erythema, Warmth, Rubor: Negative Swelling: Negative  ROM: 1. Dorsiflexion: 0 degrees 2. Plantarflexion: 30 degrees 3. Inversion: 20 degrees 4. Eversion: 5 degrees  Tenderness to Palpation:  1. Lateral Malleolus: Negative 2. Medial Malleolus: Negative 3. Proximal Fibular Pain: Negative 4. Heel Pain: Negative 5. Cuboid: Negative 6. Navicular: Negative 7. Tibiotalar Joint: + 8. Subtalar Joint: Negative 9. Posterior Recess: Negative  Tendon Pain: 1. Achilles: Negative 2. Peroneals: + 3. Posterior Tibialis: Negative 4. Tibialis Anterior: Negative  Ligament Pain: 1. ATFL: + 2. CFL: + 3. PTFL: Negative 4. Deltoid Ligaments: Negative 5. Lis Franc Ligament: Negative  Stability:  1. Anterior Drawer: 2+ AP laxity with pain, compared to 1+ AP laxity on unaffected side.  2. Posterior Drawer: Negative  Strength: 5/5 TA/GS/EHL  Pulses: 2+ DP/PT Pulses  Neuro: Intact motor and sensory  Additional Test: 1. Calcaneal Squeeze Test: Negative 2. Syndesmosis Squeeze Test: Negative [de-identified] : 4 views x-rays right ankle reviewed, 4/3/2024: No fractures present.  Os trigonum present.  Os peroneum present.

## 2024-04-03 NOTE — DISCUSSION/SUMMARY
[de-identified] : At this time because of the patient's history with her right ankle and based on her physical examination with instability noted on the exam, I do want to proceed with an MRI of the right ankle without contrast to evaluate for lateral ligament rupture, peroneal tendon tear, evaluate tibiotalar joint for cartilage injury from previous ankle sprains.  The patient will continue to use the long cam walking boot but I also gave her an ASO brace to transfer into.  I want her to stop physical therapy until the MRI is completed to see if there is any pathology seen on MRI before sending her to physical therapy.  Anti-inflammatories and Tylenol for pain.  Activity modifications is still in effect.  Patient knows that the MRI needs to be authorized by her insurance company.  Once/if the MRI is completed, the patient will return to office to review.  All of her questions were answered.  If she has additional questions she can call the office.  She understood and agreed to the treatment course.

## 2024-04-14 ENCOUNTER — APPOINTMENT (OUTPATIENT)
Dept: MRI IMAGING | Facility: CLINIC | Age: 34
End: 2024-04-14
Payer: COMMERCIAL

## 2024-04-14 ENCOUNTER — OUTPATIENT (OUTPATIENT)
Dept: OUTPATIENT SERVICES | Facility: HOSPITAL | Age: 34
LOS: 1 days | End: 2024-04-14

## 2024-04-14 DIAGNOSIS — Z00.8 ENCOUNTER FOR OTHER GENERAL EXAMINATION: ICD-10-CM

## 2024-04-14 DIAGNOSIS — Z98.89 OTHER SPECIFIED POSTPROCEDURAL STATES: Chronic | ICD-10-CM

## 2024-04-14 PROCEDURE — 73721 MRI JNT OF LWR EXTRE W/O DYE: CPT | Mod: 26,RT

## 2024-04-25 ENCOUNTER — APPOINTMENT (OUTPATIENT)
Dept: ORTHOPEDIC SURGERY | Facility: CLINIC | Age: 34
End: 2024-04-25
Payer: COMMERCIAL

## 2024-04-25 DIAGNOSIS — M25.371 OTHER INSTABILITY, RIGHT ANKLE: ICD-10-CM

## 2024-04-25 DIAGNOSIS — S93.401A SPRAIN OF UNSPECIFIED LIGAMENT OF RIGHT ANKLE, INITIAL ENCOUNTER: ICD-10-CM

## 2024-04-25 PROCEDURE — 99214 OFFICE O/P EST MOD 30 MIN: CPT

## 2024-04-25 NOTE — PHYSICAL EXAM
[de-identified] : Right ankle Physical Examination:  General: Alert and oriented x3.  In no acute distress.  Pleasant in nature with a normal affect.  No apparent respiratory distress.  Erythema, Warmth, Rubor: Negative Swelling: Negative  ROM: 1. Dorsiflexion: 0 degrees 2. Plantarflexion: 30 degrees 3. Inversion: 20 degrees 4. Eversion: 5 degrees  Tenderness to Palpation:  1. Lateral Malleolus: Negative 2. Medial Malleolus: Negative 3. Proximal Fibular Pain: Negative 4. Heel Pain: Negative 5. Cuboid: Negative 6. Navicular: Negative 7. Tibiotalar Joint: + 8. Subtalar Joint: Negative 9. Posterior Recess: Negative  Tendon Pain: 1. Achilles: Negative 2. Peroneals: + 3. Posterior Tibialis: Negative 4. Tibialis Anterior: Negative  Ligament Pain: 1. ATFL: + 2. CFL: + 3. PTFL: Negative 4. Deltoid Ligaments: Negative 5. Lis Franc Ligament: Negative  Stability:  1. Anterior Drawer: 2+ AP laxity with pain, compared to 1+ AP laxity on unaffected side.  2. Posterior Drawer: Negative  Strength: 5/5 TA/GS/EHL  Pulses: 2+ DP/PT Pulses  Neuro: Intact motor and sensory  Additional Test: 1. Calcaneal Squeeze Test: Negative 2. Syndesmosis Squeeze Test: Negative [de-identified] : EXAM: [id number] - MR ANKLE RT  - ORDERED BY: MAGNOLIA WALSH   PROCEDURE DATE:  04/14/2024    INTERPRETATION:  RIGHT ANKLE MRI  CLINICAL INFORMATION: Right ankle pain. Evaluate lateral ligaments.  TECHNIQUE: Multiplanar, multisequence MRI was obtained of the right ankle.  COMPARISON: Radiographs in [facility] dated 4/3/2024  FINDINGS:  MUSCLES AND TENDONS: Peroneal tendons, long flexor tendons, and long extensor tendons are intact. Achilles tendon is intact. LIGAMENTS: Marked thickening with edematous appearance of the anterior talofibular ligament, likely related to a low-grade partial tear with scar remodeling. Partial tearing with scar remodeling of the fibular origin of the calcaneofibular ligament. Posterior talofibular ligament appears intact. Low-grade sprain with scar remodeling of the deltoid ligamentous complex. CARTILAGE AND SUBCHONDRAL BONE: Talar dome is intact. SYNOVIUM/JOINT FLUID: Small tibiotalar/subtalar joint effusion. MARROW: Bone contusions along the medial malleolus and lateral malleolus. Os trigonum with minimal edema surrounding the synchondrosis, which may represent motion/posterior ankle impingement. PLANTAR FASCIA: Unremarkable. NEUROVASCULAR STRUCTURES: Unremarkable. SINUS TARSI: Unremarkable. PERIPHERAL/ SUB-CUTANEOUS SOFT TISSUES: Unremarkable.  IMPRESSION:  Bone contusions along the medial lateral malleoli.  Marked thickening with edematous appearance of the anterior talofibular ligament, likely related to a low-grade partial tear with scar remodeling. Partial tearing with scar remodeling of the fibular origin of the calcaneofibular ligament.  Low-grade sprain with scar remodeling of the deltoid ligamentous complex.  Os trigonum with minimal edema surrounding the synchondrosis, which may represent motion/posterior ankle impingement.  --- End of Report ---

## 2024-04-25 NOTE — DISCUSSION/SUMMARY
[de-identified] :  Today I had a lengthy discussion with the patient regarding their right ankle pain. I have addressed all the patient's concerns surrounding the pathology of their condition.  I recommend that the patient utilize ice, NSAIDS PRN, and heat. They can also elevate their right ankle above the level of the heart.  I have reviewed the patient's MRI results with them in great detail.  I recommend the patient undergo a course of physical therapy for the right ankle 2-3 times a week for a total of 8-12 weeks. A prescription was given for the physical therapy today. I recommend that the patient slowly ween off her ankle brace and transition into an ankle sleeve over the next 2 weeks.    The patient understood and verbally agreed to the treatment plan. All of their questions were answered and they were satisfied with the visit. The patient should call the office if they have any questions or experience worsening symptoms.  FU as needed.

## 2024-04-25 NOTE — HISTORY OF PRESENT ILLNESS
[FreeTextEntry1] :  04/25/2024: KAMERON SORTO is a 33 year old female presenting to the office for a follow up evaluation of her right ankle pain. She reports that her symptoms have remained relatively the same since her last visit.  The patient presents to the office in sneakers and ambulating with an ankle brace for assistance.  The patient is a 33-year-old female who presents with right ankle pain.  She injured her right ankle on 3/9/2024 after she was wearing higher shoes and accidently rolled her ankle (inversion).  Has a history of ankle rolls in the past.  Patient went to an Ortho Urgent Care on 3/10/24, wearing a long cam boot since then and has been in PT for one session.  She is here for a follow-up and for second opinion.  Patient states that she has lost trust in her ankle.  She needs the boot to stabilize her right ankle.  It does cause her moderate to severe pain with weightbearing.  Minimal pain at rest.  No locking or catching of the right ankle.  No numbness or tingling.

## 2024-04-25 NOTE — ADDENDUM
[FreeTextEntry1] : I, Pb Hernández, acted solely as a scribe for Dr. Pb Dang on this date 04/25/2024  .   All medical record entries made by the Scribe were at my, Dr. Pb Dang, direction and personally dictated by me on 04/25/2024 . I have reviewed the chart and agree that the record accurately reflects my personal performance of the history, physical exam, assessment and plan. I have also personally directed, reviewed, and agreed with the chart.

## 2024-10-01 ENCOUNTER — APPOINTMENT (OUTPATIENT)
Dept: INTERNAL MEDICINE | Facility: CLINIC | Age: 34
End: 2024-10-01

## 2024-10-01 VITALS
HEART RATE: 87 BPM | OXYGEN SATURATION: 99 % | WEIGHT: 105.31 LBS | BODY MASS INDEX: 19.38 KG/M2 | TEMPERATURE: 98.1 F | HEIGHT: 62 IN

## 2024-10-01 DIAGNOSIS — Z11.59 ENCOUNTER FOR SCREENING FOR OTHER VIRAL DISEASES: ICD-10-CM

## 2024-10-01 DIAGNOSIS — Z86.69 PERSONAL HISTORY OF OTHER DISEASES OF THE NERVOUS SYSTEM AND SENSE ORGANS: ICD-10-CM

## 2024-10-01 DIAGNOSIS — R09.89 OTHER SPECIFIED SYMPTOMS AND SIGNS INVOLVING THE CIRCULATORY AND RESPIRATORY SYSTEMS: ICD-10-CM

## 2024-10-01 DIAGNOSIS — Z00.00 ENCOUNTER FOR GENERAL ADULT MEDICAL EXAMINATION W/OUT ABNORMAL FINDINGS: ICD-10-CM

## 2024-10-01 PROCEDURE — 99395 PREV VISIT EST AGE 18-39: CPT

## 2024-10-01 PROCEDURE — 36415 COLL VENOUS BLD VENIPUNCTURE: CPT

## 2024-10-01 RX ORDER — BUSPIRONE HYDROCHLORIDE 5 MG/1
5 TABLET ORAL
Refills: 0 | Status: ACTIVE | COMMUNITY
Start: 2024-10-01

## 2024-10-01 RX ORDER — PRAZOSIN HYDROCHLORIDE 2 MG/1
2 CAPSULE ORAL AT BEDTIME
Refills: 0 | Status: ACTIVE | COMMUNITY
Start: 2024-10-01

## 2024-10-02 LAB
ALBUMIN SERPL ELPH-MCNC: 4.8 G/DL
ALP BLD-CCNC: 44 U/L
ALT SERPL-CCNC: 10 U/L
ANION GAP SERPL CALC-SCNC: 12 MMOL/L
APPEARANCE: CLEAR
AST SERPL-CCNC: 20 U/L
BACTERIA: NEGATIVE /HPF
BILIRUB SERPL-MCNC: 0.2 MG/DL
BILIRUBIN URINE: NEGATIVE
BLOOD URINE: NEGATIVE
BUN SERPL-MCNC: 11 MG/DL
CALCIUM SERPL-MCNC: 9.5 MG/DL
CAST: 0 /LPF
CHLORIDE SERPL-SCNC: 101 MMOL/L
CHOLEST SERPL-MCNC: 195 MG/DL
CO2 SERPL-SCNC: 26 MMOL/L
COLOR: YELLOW
CREAT SERPL-MCNC: 0.63 MG/DL
EGFR: 122 ML/MIN/1.73M2
EPITHELIAL CELLS: 2 /HPF
ESTIMATED AVERAGE GLUCOSE: 94 MG/DL
FERRITIN SERPL-MCNC: 55 NG/ML
GLUCOSE QUALITATIVE U: NEGATIVE MG/DL
GLUCOSE SERPL-MCNC: 101 MG/DL
HBA1C MFR BLD HPLC: 4.9 %
HCT VFR BLD CALC: 36.2 %
HDLC SERPL-MCNC: 88 MG/DL
HGB BLD-MCNC: 11.8 G/DL
IRON SATN MFR SERPL: 20 %
IRON SERPL-MCNC: 60 UG/DL
KETONES URINE: NEGATIVE MG/DL
LDLC SERPL CALC-MCNC: 97 MG/DL
LEUKOCYTE ESTERASE URINE: NEGATIVE
MCHC RBC-ENTMCNC: 32.3 PG
MCHC RBC-ENTMCNC: 32.6 GM/DL
MCV RBC AUTO: 99.2 FL
MICROSCOPIC-UA: NORMAL
NITRITE URINE: NEGATIVE
NONHDLC SERPL-MCNC: 108 MG/DL
PH URINE: 7
PLATELET # BLD AUTO: 306 K/UL
POTASSIUM SERPL-SCNC: 4.5 MMOL/L
PROT SERPL-MCNC: 7.1 G/DL
PROTEIN URINE: NEGATIVE MG/DL
RBC # BLD: 3.65 M/UL
RBC # FLD: 12.4 %
RED BLOOD CELLS URINE: 0 /HPF
SODIUM SERPL-SCNC: 139 MMOL/L
SPECIFIC GRAVITY URINE: 1.01
T4 FREE SERPL-MCNC: 1.1 NG/DL
TIBC SERPL-MCNC: 307 UG/DL
TRIGL SERPL-MCNC: 56 MG/DL
TSH SERPL-ACNC: 2.42 UIU/ML
UIBC SERPL-MCNC: 247 UG/DL
UROBILINOGEN URINE: 0.2 MG/DL
VIT B12 SERPL-MCNC: 606 PG/ML
WBC # FLD AUTO: 7.91 K/UL
WHITE BLOOD CELLS URINE: 0 /HPF

## 2025-01-14 ENCOUNTER — APPOINTMENT (OUTPATIENT)
Dept: OBGYN | Facility: CLINIC | Age: 35
End: 2025-01-14
Payer: COMMERCIAL

## 2025-01-14 PROCEDURE — 99459 PELVIC EXAMINATION: CPT

## 2025-01-14 PROCEDURE — 36415 COLL VENOUS BLD VENIPUNCTURE: CPT

## 2025-01-14 PROCEDURE — 99385 PREV VISIT NEW AGE 18-39: CPT
